# Patient Record
Sex: FEMALE | Race: WHITE | NOT HISPANIC OR LATINO | Employment: UNEMPLOYED | URBAN - METROPOLITAN AREA
[De-identification: names, ages, dates, MRNs, and addresses within clinical notes are randomized per-mention and may not be internally consistent; named-entity substitution may affect disease eponyms.]

---

## 2018-02-01 ENCOUNTER — COMMUNICATION - HEALTHEAST (OUTPATIENT)
Dept: SCHEDULING | Facility: CLINIC | Age: 48
End: 2018-02-01

## 2018-02-05 ENCOUNTER — OFFICE VISIT - HEALTHEAST (OUTPATIENT)
Dept: FAMILY MEDICINE | Facility: CLINIC | Age: 48
End: 2018-02-05

## 2018-02-05 DIAGNOSIS — J04.0 ACUTE LARYNGITIS: ICD-10-CM

## 2018-02-05 DIAGNOSIS — B34.9 VIRAL SYNDROME: ICD-10-CM

## 2018-02-05 ASSESSMENT — MIFFLIN-ST. JEOR: SCORE: 1326.69

## 2018-02-09 ENCOUNTER — OFFICE VISIT - HEALTHEAST (OUTPATIENT)
Dept: FAMILY MEDICINE | Facility: CLINIC | Age: 48
End: 2018-02-09

## 2018-02-09 DIAGNOSIS — R68.89 FLU-LIKE SYMPTOMS: ICD-10-CM

## 2018-07-05 ENCOUNTER — OFFICE VISIT - HEALTHEAST (OUTPATIENT)
Dept: FAMILY MEDICINE | Facility: CLINIC | Age: 48
End: 2018-07-05

## 2018-07-05 DIAGNOSIS — G44.209 TENSION HEADACHE: ICD-10-CM

## 2018-11-13 ENCOUNTER — COMMUNICATION - HEALTHEAST (OUTPATIENT)
Dept: FAMILY MEDICINE | Facility: CLINIC | Age: 48
End: 2018-11-13

## 2018-11-26 ENCOUNTER — OFFICE VISIT - HEALTHEAST (OUTPATIENT)
Dept: FAMILY MEDICINE | Facility: CLINIC | Age: 48
End: 2018-11-26

## 2018-11-26 DIAGNOSIS — Z12.4 SCREENING FOR MALIGNANT NEOPLASM OF CERVIX: ICD-10-CM

## 2018-11-26 DIAGNOSIS — Z12.31 VISIT FOR SCREENING MAMMOGRAM: ICD-10-CM

## 2018-11-26 DIAGNOSIS — Z13.220 SCREENING FOR LIPID DISORDERS: ICD-10-CM

## 2018-11-26 DIAGNOSIS — Z13.1 SCREENING FOR DIABETES MELLITUS: ICD-10-CM

## 2018-11-26 DIAGNOSIS — K43.9 EPIGASTRIC HERNIA: ICD-10-CM

## 2018-11-26 DIAGNOSIS — G43.009 MIGRAINE WITHOUT AURA AND WITHOUT STATUS MIGRAINOSUS, NOT INTRACTABLE: ICD-10-CM

## 2018-11-26 DIAGNOSIS — M67.441 DIGITAL MUCOUS CYST OF RIGHT HAND: ICD-10-CM

## 2018-11-26 DIAGNOSIS — Z11.3 SCREEN FOR STD (SEXUALLY TRANSMITTED DISEASE): ICD-10-CM

## 2018-11-26 DIAGNOSIS — Z00.00 ROUTINE HEALTH MAINTENANCE: ICD-10-CM

## 2018-11-26 ASSESSMENT — MIFFLIN-ST. JEOR: SCORE: 1278.37

## 2018-11-27 LAB
C TRACH DNA SPEC QL PROBE+SIG AMP: NEGATIVE
HPV SOURCE: NORMAL
HUMAN PAPILLOMA VIRUS 16 DNA: NEGATIVE
HUMAN PAPILLOMA VIRUS 18 DNA: NEGATIVE
HUMAN PAPILLOMA VIRUS FINAL DIAGNOSIS: NORMAL
HUMAN PAPILLOMA VIRUS OTHER HR: NEGATIVE
N GONORRHOEA DNA SPEC QL NAA+PROBE: NEGATIVE
SPECIMEN DESCRIPTION: NORMAL

## 2018-12-05 LAB
BKR LAB AP ABNORMAL BLEEDING: YES
BKR LAB AP BIRTH CONTROL/HORMONES: NORMAL
BKR LAB AP CERVICAL APPEARANCE: NORMAL
BKR LAB AP GYN ADEQUACY: NORMAL
BKR LAB AP GYN INTERPRETATION: NORMAL
BKR LAB AP HPV REFLEX: NORMAL
BKR LAB AP LMP: NORMAL
BKR LAB AP PATIENT STATUS: NORMAL
BKR LAB AP PREVIOUS ABNORMAL: NORMAL
BKR LAB AP PREVIOUS NORMAL: NORMAL
HIGH RISK?: NO
PATH REPORT.COMMENTS IMP SPEC: NORMAL
RESULT FLAG (HE HISTORICAL CONVERSION): NORMAL

## 2018-12-06 ENCOUNTER — COMMUNICATION - HEALTHEAST (OUTPATIENT)
Dept: FAMILY MEDICINE | Facility: CLINIC | Age: 48
End: 2018-12-06

## 2019-01-08 ENCOUNTER — COMMUNICATION - HEALTHEAST (OUTPATIENT)
Dept: FAMILY MEDICINE | Facility: CLINIC | Age: 49
End: 2019-01-08

## 2019-01-08 DIAGNOSIS — M79.644 PAIN OF FINGER OF RIGHT HAND: ICD-10-CM

## 2019-01-11 ENCOUNTER — OFFICE VISIT - HEALTHEAST (OUTPATIENT)
Dept: FAMILY MEDICINE | Facility: CLINIC | Age: 49
End: 2019-01-11

## 2019-01-11 DIAGNOSIS — G43.009 MIGRAINE WITHOUT AURA AND WITHOUT STATUS MIGRAINOSUS, NOT INTRACTABLE: ICD-10-CM

## 2019-01-11 DIAGNOSIS — I73.00 RAYNAUD'S DISEASE WITHOUT GANGRENE: ICD-10-CM

## 2019-02-21 ENCOUNTER — COMMUNICATION - HEALTHEAST (OUTPATIENT)
Dept: FAMILY MEDICINE | Facility: CLINIC | Age: 49
End: 2019-02-21

## 2019-05-28 ENCOUNTER — OFFICE VISIT - HEALTHEAST (OUTPATIENT)
Dept: FAMILY MEDICINE | Facility: CLINIC | Age: 49
End: 2019-05-28

## 2019-05-28 DIAGNOSIS — G43.009 MIGRAINE WITHOUT AURA AND WITHOUT STATUS MIGRAINOSUS, NOT INTRACTABLE: ICD-10-CM

## 2019-05-28 DIAGNOSIS — Z13.1 SCREENING FOR DIABETES MELLITUS: ICD-10-CM

## 2019-05-28 DIAGNOSIS — R42 VERTIGO: ICD-10-CM

## 2019-05-28 DIAGNOSIS — Z13.220 SCREENING FOR LIPID DISORDERS: ICD-10-CM

## 2019-05-29 ENCOUNTER — COMMUNICATION - HEALTHEAST (OUTPATIENT)
Dept: LAB | Facility: CLINIC | Age: 49
End: 2019-05-29

## 2019-06-17 ENCOUNTER — COMMUNICATION - HEALTHEAST (OUTPATIENT)
Dept: FAMILY MEDICINE | Facility: CLINIC | Age: 49
End: 2019-06-17

## 2019-06-18 ENCOUNTER — OFFICE VISIT - HEALTHEAST (OUTPATIENT)
Dept: FAMILY MEDICINE | Facility: CLINIC | Age: 49
End: 2019-06-18

## 2019-06-18 ENCOUNTER — COMMUNICATION - HEALTHEAST (OUTPATIENT)
Dept: SCHEDULING | Facility: CLINIC | Age: 49
End: 2019-06-18

## 2019-06-18 DIAGNOSIS — Z00.00 ROUTINE HEALTH MAINTENANCE: ICD-10-CM

## 2019-06-18 DIAGNOSIS — R79.89 ELEVATED TSH: ICD-10-CM

## 2019-06-18 DIAGNOSIS — M25.531 RIGHT WRIST PAIN: ICD-10-CM

## 2019-06-18 DIAGNOSIS — N92.4 PERIMENOPAUSAL MENORRHAGIA: ICD-10-CM

## 2019-06-18 LAB
ANION GAP SERPL CALCULATED.3IONS-SCNC: 11 MMOL/L (ref 5–18)
BUN SERPL-MCNC: 12 MG/DL (ref 8–22)
CALCIUM SERPL-MCNC: 9 MG/DL (ref 8.5–10.5)
CHLORIDE BLD-SCNC: 107 MMOL/L (ref 98–107)
CHOLEST SERPL-MCNC: 205 MG/DL
CO2 SERPL-SCNC: 24 MMOL/L (ref 22–31)
CREAT SERPL-MCNC: 0.69 MG/DL (ref 0.6–1.1)
ERYTHROCYTE [DISTWIDTH] IN BLOOD BY AUTOMATED COUNT: 11.3 % (ref 11–14.5)
FASTING STATUS PATIENT QL REPORTED: YES
GFR SERPL CREATININE-BSD FRML MDRD: >60 ML/MIN/1.73M2
GLUCOSE BLD-MCNC: 59 MG/DL (ref 70–125)
HCT VFR BLD AUTO: 38.6 % (ref 35–47)
HDLC SERPL-MCNC: 67 MG/DL
HGB BLD-MCNC: 13 G/DL (ref 12–16)
LDLC SERPL CALC-MCNC: 123 MG/DL
MCH RBC QN AUTO: 31.2 PG (ref 27–34)
MCHC RBC AUTO-ENTMCNC: 33.6 G/DL (ref 32–36)
MCV RBC AUTO: 93 FL (ref 80–100)
PLATELET # BLD AUTO: 265 THOU/UL (ref 140–440)
PMV BLD AUTO: 6.7 FL (ref 7–10)
POTASSIUM BLD-SCNC: 4.3 MMOL/L (ref 3.5–5)
RBC # BLD AUTO: 4.15 MILL/UL (ref 3.8–5.4)
SODIUM SERPL-SCNC: 142 MMOL/L (ref 136–145)
T4 FREE SERPL-MCNC: 0.8 NG/DL (ref 0.7–1.8)
TRIGL SERPL-MCNC: 77 MG/DL
TSH SERPL DL<=0.005 MIU/L-ACNC: 8.13 UIU/ML (ref 0.3–5)
WBC: 7.9 THOU/UL (ref 4–11)

## 2019-06-19 LAB — 25(OH)D3 SERPL-MCNC: 29.2 NG/ML (ref 30–80)

## 2019-06-20 LAB — THYROID PEROXIDASE ANTIBODIES - HISTORICAL: 855.8 IU/ML (ref 0–5.6)

## 2019-06-21 ENCOUNTER — RECORDS - HEALTHEAST (OUTPATIENT)
Dept: ADMINISTRATIVE | Facility: OTHER | Age: 49
End: 2019-06-21

## 2019-06-21 ENCOUNTER — COMMUNICATION - HEALTHEAST (OUTPATIENT)
Dept: FAMILY MEDICINE | Facility: CLINIC | Age: 49
End: 2019-06-21

## 2019-06-21 DIAGNOSIS — E06.3 HYPOTHYROIDISM DUE TO HASHIMOTO'S THYROIDITIS: ICD-10-CM

## 2019-07-01 ENCOUNTER — AMBULATORY - HEALTHEAST (OUTPATIENT)
Dept: FAMILY MEDICINE | Facility: CLINIC | Age: 49
End: 2019-07-01

## 2019-07-01 ENCOUNTER — HOSPITAL ENCOUNTER (OUTPATIENT)
Dept: MAMMOGRAPHY | Facility: CLINIC | Age: 49
Discharge: HOME | End: 2019-07-01

## 2019-07-01 ENCOUNTER — COMMUNICATION - HEALTHEAST (OUTPATIENT)
Dept: FAMILY MEDICINE | Facility: CLINIC | Age: 49
End: 2019-07-01

## 2019-07-01 ENCOUNTER — HOSPITAL ENCOUNTER (OUTPATIENT)
Dept: ULTRASOUND IMAGING | Facility: CLINIC | Age: 49
Discharge: HOME | End: 2019-07-01

## 2019-07-01 DIAGNOSIS — Z12.31 VISIT FOR SCREENING MAMMOGRAM: ICD-10-CM

## 2019-07-01 DIAGNOSIS — R93.89 ENDOMETRIAL THICKENING ON ULTRASOUND: ICD-10-CM

## 2019-07-01 DIAGNOSIS — N92.4 PERIMENOPAUSAL MENORRHAGIA: ICD-10-CM

## 2019-07-02 ENCOUNTER — COMMUNICATION - HEALTHEAST (OUTPATIENT)
Dept: FAMILY MEDICINE | Facility: CLINIC | Age: 49
End: 2019-07-02

## 2019-07-03 ENCOUNTER — RECORDS - HEALTHEAST (OUTPATIENT)
Dept: ADMINISTRATIVE | Facility: OTHER | Age: 49
End: 2019-07-03

## 2019-07-10 ENCOUNTER — COMMUNICATION - HEALTHEAST (OUTPATIENT)
Dept: FAMILY MEDICINE | Facility: CLINIC | Age: 49
End: 2019-07-10

## 2019-07-15 ENCOUNTER — OFFICE VISIT - HEALTHEAST (OUTPATIENT)
Dept: FAMILY MEDICINE | Facility: CLINIC | Age: 49
End: 2019-07-15

## 2019-07-15 ENCOUNTER — AMBULATORY - HEALTHEAST (OUTPATIENT)
Dept: FAMILY MEDICINE | Facility: CLINIC | Age: 49
End: 2019-07-15

## 2019-07-15 DIAGNOSIS — E06.3 HYPOTHYROIDISM DUE TO HASHIMOTO'S THYROIDITIS: ICD-10-CM

## 2019-07-15 DIAGNOSIS — E16.2 HYPOGLYCEMIA: ICD-10-CM

## 2019-07-15 DIAGNOSIS — G43.009 MIGRAINE WITHOUT AURA AND WITHOUT STATUS MIGRAINOSUS, NOT INTRACTABLE: ICD-10-CM

## 2019-07-15 LAB
FASTING STATUS PATIENT QL REPORTED: NORMAL
GLUCOSE BLD-MCNC: 116 MG/DL (ref 74–125)
HBA1C MFR BLD: 5.2 % (ref 3.5–6)

## 2019-07-19 ENCOUNTER — COMMUNICATION - HEALTHEAST (OUTPATIENT)
Dept: FAMILY MEDICINE | Facility: CLINIC | Age: 49
End: 2019-07-19

## 2019-07-19 DIAGNOSIS — Z86.79 HISTORY OF MITRAL VALVE PROLAPSE: ICD-10-CM

## 2019-07-23 ENCOUNTER — OFFICE VISIT - HEALTHEAST (OUTPATIENT)
Dept: FAMILY MEDICINE | Facility: CLINIC | Age: 49
End: 2019-07-23

## 2019-07-23 DIAGNOSIS — Z01.818 ENCOUNTER FOR PREOPERATIVE EXAMINATION FOR GENERAL SURGICAL PROCEDURE: ICD-10-CM

## 2019-07-23 DIAGNOSIS — E06.3 HYPOTHYROIDISM DUE TO HASHIMOTO'S THYROIDITIS: ICD-10-CM

## 2019-07-23 DIAGNOSIS — N92.1 MENORRHAGIA WITH IRREGULAR CYCLE: ICD-10-CM

## 2019-07-23 DIAGNOSIS — R93.89 THICKENED ENDOMETRIUM: ICD-10-CM

## 2019-07-23 LAB — HGB BLD-MCNC: 13.4 G/DL (ref 12–16)

## 2019-07-23 ASSESSMENT — MIFFLIN-ST. JEOR: SCORE: 1292.1

## 2019-08-02 ASSESSMENT — MIFFLIN-ST. JEOR: SCORE: 1296.07

## 2019-08-06 ENCOUNTER — ANESTHESIA - HEALTHEAST (OUTPATIENT)
Dept: SURGERY | Facility: HOSPITAL | Age: 49
End: 2019-08-06

## 2019-08-06 ENCOUNTER — SURGERY - HEALTHEAST (OUTPATIENT)
Dept: SURGERY | Facility: HOSPITAL | Age: 49
End: 2019-08-06

## 2019-08-06 ASSESSMENT — MIFFLIN-ST. JEOR: SCORE: 1307.7

## 2019-08-30 ENCOUNTER — RECORDS - HEALTHEAST (OUTPATIENT)
Dept: ADMINISTRATIVE | Facility: OTHER | Age: 49
End: 2019-08-30

## 2019-09-10 ENCOUNTER — RECORDS - HEALTHEAST (OUTPATIENT)
Dept: ADMINISTRATIVE | Facility: OTHER | Age: 49
End: 2019-09-10

## 2019-12-16 ENCOUNTER — OFFICE VISIT - HEALTHEAST (OUTPATIENT)
Dept: FAMILY MEDICINE | Facility: CLINIC | Age: 49
End: 2019-12-16

## 2019-12-16 DIAGNOSIS — G43.009 MIGRAINE WITHOUT AURA AND WITHOUT STATUS MIGRAINOSUS, NOT INTRACTABLE: ICD-10-CM

## 2019-12-16 DIAGNOSIS — Z01.818 ENCOUNTER FOR PREOPERATIVE EXAMINATION FOR GENERAL SURGICAL PROCEDURE: ICD-10-CM

## 2019-12-16 DIAGNOSIS — R22.31 MASS OF RIGHT HAND: ICD-10-CM

## 2019-12-16 RX ORDER — IBUPROFEN 600 MG/1
600 TABLET, FILM COATED ORAL EVERY 6 HOURS PRN
Qty: 30 TABLET | Refills: 0 | Status: SHIPPED | OUTPATIENT
Start: 2019-12-16

## 2019-12-16 RX ORDER — SUMATRIPTAN 50 MG/1
50 TABLET, FILM COATED ORAL
Qty: 20 TABLET | Refills: 1 | Status: SHIPPED | OUTPATIENT
Start: 2019-12-16

## 2019-12-24 ENCOUNTER — RECORDS - HEALTHEAST (OUTPATIENT)
Dept: ADMINISTRATIVE | Facility: OTHER | Age: 49
End: 2019-12-24

## 2019-12-24 LAB
LAB AP CHARGES (HE HISTORICAL CONVERSION): NORMAL
PATH REPORT.COMMENTS IMP SPEC: NORMAL
PATH REPORT.FINAL DX SPEC: NORMAL
PATH REPORT.GROSS SPEC: NORMAL
PATH REPORT.MICROSCOPIC SPEC OTHER STN: NORMAL
PATH REPORT.RELEVANT HX SPEC: NORMAL
RESULT FLAG (HE HISTORICAL CONVERSION): NORMAL

## 2020-06-05 ENCOUNTER — OFFICE VISIT - HEALTHEAST (OUTPATIENT)
Dept: FAMILY MEDICINE | Facility: CLINIC | Age: 50
End: 2020-06-05

## 2020-06-05 DIAGNOSIS — Z20.822 SUSPECTED 2019 NOVEL CORONAVIRUS INFECTION: ICD-10-CM

## 2020-06-16 ENCOUNTER — COMMUNICATION - HEALTHEAST (OUTPATIENT)
Dept: SCHEDULING | Facility: CLINIC | Age: 50
End: 2020-06-16

## 2020-06-16 ENCOUNTER — OFFICE VISIT - HEALTHEAST (OUTPATIENT)
Dept: FAMILY MEDICINE | Facility: CLINIC | Age: 50
End: 2020-06-16

## 2020-06-16 ENCOUNTER — COMMUNICATION - HEALTHEAST (OUTPATIENT)
Dept: FAMILY MEDICINE | Facility: CLINIC | Age: 50
End: 2020-06-16

## 2020-06-16 DIAGNOSIS — B02.9 HERPES ZOSTER WITHOUT COMPLICATION: ICD-10-CM

## 2020-06-18 ENCOUNTER — OFFICE VISIT - HEALTHEAST (OUTPATIENT)
Dept: FAMILY MEDICINE | Facility: CLINIC | Age: 50
End: 2020-06-18

## 2020-06-18 DIAGNOSIS — E06.3 HYPOTHYROIDISM DUE TO HASHIMOTO'S THYROIDITIS: ICD-10-CM

## 2020-06-18 DIAGNOSIS — F41.8 SITUATIONAL ANXIETY: ICD-10-CM

## 2020-06-18 RX ORDER — HYDROXYZINE HYDROCHLORIDE 50 MG/1
50-100 TABLET, FILM COATED ORAL 3 TIMES DAILY PRN
Qty: 60 TABLET | Refills: 0 | Status: SHIPPED | OUTPATIENT
Start: 2020-06-18 | End: 2023-01-27

## 2020-06-18 ASSESSMENT — ANXIETY QUESTIONNAIRES
4. TROUBLE RELAXING: NEARLY EVERY DAY
7. FEELING AFRAID AS IF SOMETHING AWFUL MIGHT HAPPEN: NEARLY EVERY DAY
3. WORRYING TOO MUCH ABOUT DIFFERENT THINGS: NEARLY EVERY DAY
5. BEING SO RESTLESS THAT IT IS HARD TO SIT STILL: NEARLY EVERY DAY
6. BECOMING EASILY ANNOYED OR IRRITABLE: NEARLY EVERY DAY
GAD7 TOTAL SCORE: 21
IF YOU CHECKED OFF ANY PROBLEMS ON THIS QUESTIONNAIRE, HOW DIFFICULT HAVE THESE PROBLEMS MADE IT FOR YOU TO DO YOUR WORK, TAKE CARE OF THINGS AT HOME, OR GET ALONG WITH OTHER PEOPLE: VERY DIFFICULT
1. FEELING NERVOUS, ANXIOUS, OR ON EDGE: NEARLY EVERY DAY
2. NOT BEING ABLE TO STOP OR CONTROL WORRYING: NEARLY EVERY DAY

## 2020-06-18 ASSESSMENT — PATIENT HEALTH QUESTIONNAIRE - PHQ9: SUM OF ALL RESPONSES TO PHQ QUESTIONS 1-9: 22

## 2021-05-26 ASSESSMENT — PATIENT HEALTH QUESTIONNAIRE - PHQ9: SUM OF ALL RESPONSES TO PHQ QUESTIONS 1-9: 22

## 2021-05-28 ASSESSMENT — ANXIETY QUESTIONNAIRES: GAD7 TOTAL SCORE: 21

## 2021-05-29 NOTE — TELEPHONE ENCOUNTER
Describe your symptoms: Right wrist pain- cyst in palm. Painful, tender to touch, swollen.   Any pain: yes  New/Ongoing: Ongoing  How long have you been having symptoms: 1  week(s)  Have you been seen for this:  Yes.  Have your symptoms changed since this visit? Yes: Continues to be painful, tender to touch, swollen  Triage offered?: patient declined  Home remedies tried: OTC Ibuprofen, OTC Tylenol, Wrist brace  Pharmacy Name and Location: [facility]  Okay to leave a detailed message? Yes       Patient states that this was discussed on 1/11/2019 at her [facility] with Maryjane Palacios NP. However, the pain is becoming more problematic. Does she need to be seen again? Can she be referred to a specialist? CT or MRI scan?    Please advise

## 2021-05-29 NOTE — TELEPHONE ENCOUNTER
Call to patient regarding labs results.    TSH is elevated.  Thyroid antibodies elevated consistent with autoimmune thyroiditis.    Start Levothyroxine 1 mcg/kg/day - 75 mcg daily.  Discussed proper use of medication.    Follow up in 8-12 weeks for recheck of labs.    Maryjane Palacios NP

## 2021-05-29 NOTE — TELEPHONE ENCOUNTER
Patient Returning Call  Reason for call:  Patient  Information relayed to patient:  Patient informed of need for visit with provider.  Patient has additional questions:  Yes  If YES, what are your questions/concerns:  She is wondering if she can be worked in at the end of a day to avoid taking time of work. Please advise.  Okay to leave a detailed message?: Yes

## 2021-05-29 NOTE — TELEPHONE ENCOUNTER
There were labs ordered by Maryjane last November that were released for blood draw yesterday when you saw the patient. I don't see that she made it to lab for blood draw. Was she suppose to have these done or was this a interfacing error?

## 2021-05-29 NOTE — PROGRESS NOTES
Assessment:   1. Vertigo     Plan:   Meclizine per medication orders.  Antiemetic per medication orders.  OTC decongestants.  Tilt table testing.  Vestibular studies  The nature of vertigo syndromes were discussed along with their usual course and treatment.  Educational materials given and questions answered.  Follow up in 2 weeks.     Subjective:   Mavis Connell is a 48 y.o. female who presents for evaluation of dizziness. The symptoms started 8 days ago and are ongoing. The attacks occur frequently and last a few minutes. Positions that worsen symptoms: bending over, head back, rolling in bed to the right and standing up. Previous workup/treatments: none. Associated ear symptoms: aural pressure. Associated CNS symptoms: none. Recent infections: none. Head trauma: denied. Drug ingestion: none. Noise exposure: no occupational exposure and phone service. Family history: non-contributory.    The following portions of the patient's history were reviewed and updated as appropriate: allergies, current medications, past family history, past medical history, past social history, past surgical history and problem list.    Review of Systems  Pertinent items are noted in HPI.      Objective:   /79   Pulse 78   Wt 159 lb 8 oz (72.3 kg)   SpO2 97%   BMI 29.65 kg/m    General appearance: alert, appears stated age and cooperative  Head: Normocephalic, without obvious abnormality, atraumatic  Eyes: conjunctivae/corneas clear. PERRL, EOM's intact. Fundi benign.  Ears: normal TM's and external ear canals both ears  Throat: lips, mucosa, and tongue normal; teeth and gums normal  Lungs: clear to auscultation bilaterally  Heart: regular rate and rhythm, S1, S2 normal, no murmur, click, rub or gallop  Pulses: 2+ and symmetric  Skin: Skin color, texture, turgor normal. No rashes or lesions  Lymph nodes: Cervical, supraclavicular, and axillary nodes normal.  Neurologic: Grossly normal

## 2021-05-29 NOTE — TELEPHONE ENCOUNTER
RN triage   Lab calling with critical lab  Glucose 59 drawn at 9:09 am  Patient seen in clinic today and had fasting labs done.  Contacted patient, she states that she is feeling well, no shakiness, sweating, dizziness.  She has eaten through out the day today.    Paged on call provider for review 6:55 pm  [place] paging called to say the Dr. Ohara is on her cell phone for calls. Left voicemail requesting return call.  Jacinta Graves RN  [facility] Triage Nurse  7:00 PM  6/18/2019  Spoke with on call Dr. Ohara, no changes PCP to review.  Left message for patient to call back.  Jacinta Graves RN  [facility] Triage Nurse  7:09 PM  6/18/2019            Reason for Disposition    Lab or radiology calling with CRITICAL test results    Protocols used: PCP CALL - NO TRIAGE-A-

## 2021-05-29 NOTE — PROGRESS NOTES
Assessment/Plan:     1. Routine health maintenance  Previously did not have her fasting labs completed after her last physical.  Will complete these today, as she is fasting.  - Vitamin D, Total (25-Hydroxy)  - HM2(CBC w/o Differential)  - Thyroid Cascade  - Basic Metabolic Panel  - Lipid Cascade FASTING    2. Right wrist pain  Suspect a cyst in the hand and wrist.  Will refer to orthopedics to discuss if removal is warranted.   - Ambulatory referral to Orthopedics    3. Perimenopausal menorrhagia  Will send for pelvic US to r/o fibroids or other cause of bleeding.  May just be perimenopausal irregularity.  If US is normal, can consider OCPs, IUD, or referral to gynecology for ablation.   - US Pelvis With Transvaginal Non OB; Future        Subjective:     Mavis Connell is a 48 y.o. female who presents with complaints of right hand and wrist pain.  Patient previously has been seen for a small cyst on the palm of her right hand.  She does believe this has got an bigger.  It is tender when she tries to use a mouse at work.  Patient has also developed a small mass on the ulnar side of her right wrist.  This developed about 1 to 2 weeks ago, and has gotten bigger.  She does have some numbness and tingling in her fifth finger.  The pain is bothersome, especially when she is using a computer.    Patient also complains of very heavy menstrual bleeding.  Her menstrual cycles are irregular.  She did not have a period for about 3 months, then will get very heavy periods for a couple of months.  States she is currently on day 9 of bleeding.  She tells me she uses up to 12 maxipads every 4 hours.  The amount of bleeding is getting in the way of her being productive at work.  Denies any significant abdominal pain or cramping.      The following portions of the patient's history were reviewed and updated as appropriate: allergies, current medications.    Review of Systems  A comprehensive review of systems was performed and was  otherwise negative    Objective:     /60   Pulse 60   Wt 159 lb 11.2 oz (72.4 kg)   BMI 29.69 kg/m      General Appearance: Alert, cooperative, no distress, appears stated age  Lungs: Clear to auscultation bilaterally, respirations unlabored  Heart: Regular rate and rhythm, S1 and S2 normal, no murmur, rub, or gallop   Abdomen: Soft, non-tender, bowel sounds active all four quadrants,  no masses, no organomegaly  Extremities: Extremities normal, atraumatic, no cyanosis or edema  Skin: Palpable, nontender, soft mass on the palm of right hand and ulnar side of right wrist.   strength normal.  Sensation grossly intact.    Maryjane Palacios, NP

## 2021-05-29 NOTE — TELEPHONE ENCOUNTER
Called got disconnected. If Pt called back inform her an office visit is recommended so Maryjane can reassess her.

## 2021-05-30 NOTE — TELEPHONE ENCOUNTER
Call to patient.  Discussed her concerns.  She is scheduled with gynecology tomorrow.    Maryjane Palacios NP

## 2021-05-30 NOTE — TELEPHONE ENCOUNTER
Left detailed message regarding clinician's message. Instructed patient to call main clinic number to schedule appointment.

## 2021-05-30 NOTE — TELEPHONE ENCOUNTER
Medication Request  Medication name: Amoxicillin  Pharmacy Name and Location: [facility]  Reason for request: Patient has a dental appointment for tooth repair Monday morning at 7 am (7.22.2019) and needs to be apophylactically treated due to history of open heart surgery.   When did you use medication last?:  2017  Patient offered appointment:  patient declined  Okay to leave a detailed message: yes

## 2021-05-30 NOTE — PROGRESS NOTES
Assessment/Plan:     1. Hypothyroidism due to Hashimoto's thyroiditis  I am not quite sure if patient's symptoms are related to the levothyroxine.  She did have previous issues with dizziness, nausea, and headaches.  Regardless, I think it would be fine for her to stop the levothyroxine at this point.  We decided to get her past her hysterectomy, and then reevaluate.  I would like to see her about 2 to 3 weeks after her surgery.  We will recheck a thyroid level at that point, and discuss putting her back on the levothyroxine.  Will likely started a smaller dose.  She continues not to tolerate it, I would recommend endocrinology input.    2. Migraine without aura and without status migrainosus, not intractable  - SUMAtriptan (IMITREX) 50 MG tablet; Take 1 tablet (50 mg total) by mouth every 2 (two) hours as needed for migraine. Up to 200 mg/day.  Dispense: 20 tablet; Refill: 0  - ondansetron (ZOFRAN) 4 MG tablet; Take 1 tablet (4 mg total) by mouth every 8 (eight) hours as needed for nausea.  Dispense: 30 tablet; Refill: 0    3. Hypoglycemia  Will check a nonfasting glucose and Hgb A1c today to rule out diabetes.   - Glucose  - Glycosylated Hemoglobin A1c      Subjective:     Mavis Connell is a 48 y.o. female who presents for follow up regarding hypothyroidism.  Patient was recently started on Levothyroxine.  She had an elevated TSH and thyroid antibodies.  Labs were completed at her routine physical.  Patient reports symptoms of headaches, dizziness, and nausea since starting the medication.  She has vomited on a couple of occasions.  Denies any significant abdominal pain.  She stopped the Levothyroxine about 2 days ago, and the headaches and dizziness are better.  She is still having a little bit of nausea.  Patient states she did have more energy whil she was on the medication.  She also had a blood sugar of 59 with her labs.  She reports she is eating regularly.  She is currently drinking a regular Coke.  She does  have less nausea and dizziness about 1 hour after she eats.  Patient is scheduled for a hysterectomy next month.      The following portions of the patient's history were reviewed and updated as appropriate: allergies, current medications.    Review of Systems  A comprehensive review of systems was performed and was otherwise negative    Objective:     /80   Pulse 60   Temp 98.8  F (37.1  C)   Wt 159 lb (72.1 kg)   BMI 29.56 kg/m      General Appearance: Alert, cooperative, no distress, appears stated age  Eyes: PERRL, conjunctiva/corneas clear, EOM's intact  Throat: Lips, mucosa, and tongue normal; teeth and gums normal  Neck: Supple, symmetrical, trachea midline, no adenopathy  Lungs: Clear to auscultation bilaterally, respirations unlabored  Heart: Regular rate and rhythm, S1 and S2 normal, no murmur, rub, or gallop   Abdomen: Soft, non-tender, bowel sounds active all four quadrants,  no masses, no organomegaly      Maryjane Palacios, RADHA

## 2021-05-30 NOTE — TELEPHONE ENCOUNTER
Patient informed of clinician's message. No further questions.   Patient will go to [facility] today.

## 2021-05-30 NOTE — TELEPHONE ENCOUNTER
Describe your symptoms: on and off  Dizziness, Sweats , nausea   Any pain: no  New/Ongoing: Ongoing  How long have you been having symptoms: 3 months  Have you been seen for this:  Yes.  Have your symptoms changed since this visit? No  Triage offered?: patient declined  Home remedies tried: NA  Pharmacy Name and Location: listed   Okay to leave a detailed message? Yes   Please advise on the next best course of action ASAP. Patient would like to be seen . Please all ASAP patient requesting to speak to MD. Or Nurse .  Thank You

## 2021-05-30 NOTE — TELEPHONE ENCOUNTER
----- Message from Maryjane Palacios NP sent at 7/1/2019  4:42 PM CDT -----  Please call patient.    Her pelvic ultrasound shows a thickened lining of her uterus.  This is likely the cause of your heavy and irregular bleeding.  I recommend a referral to gynecology for a possible biopsy.  I have placed this referral.    Maryjane Palacios NP

## 2021-05-30 NOTE — TELEPHONE ENCOUNTER
Medication Question or Clarification  Who is calling: Patient  What medication are you calling about? (include dose and sig) Levothyroxine 75  Who prescribed the medication?: Maryjane Palacios NP  What is your question/concern?: Patient calling stating since she started taking medication, feeling nauseous , light headed, cant sleep, muscle aches, Please call and advise.   Pharmacy: [facility]- [address]   Okay to leave a detailed message?: Yes  Site CMT - Please call the pharmacy to obtain any additional needed information.

## 2021-05-30 NOTE — TELEPHONE ENCOUNTER
Patient has concerns relating to her ultrasound findings - especially related to cancer. Patient would like you to call her if possible to discuss further.

## 2021-05-30 NOTE — PROGRESS NOTES
Preoperative Exam    Scheduled Procedure: ROBOTIC TOTAL LAPAROSCOPIC HYSTERECTOMY BILATERAL SALPINGECTOMY  Surgery Date:  8/6/19  Surgery Location: [facility], fax [phone] and  [phone]  Surgeon:  Va Beatty DO    Assessment/Plan:     1. Encounter for preoperative examination for general surgical procedure  Hemoglobin stable.  Recommend a urine pregnancy test the morning of surgery.  - Hemoglobin    2. Menorrhagia with irregular cycle    3. Thickened endometrium    4. Hypothyroidism due to Hashimoto's thyroiditis  Did not tolerate Levothyroxine well.  She is currently off replacement. Will plan on rechecking her TSH 1 month after surgery and consider restarting Levothyroxine at a lower dose.  This does not affect her surgery.     Surgical Procedure Risk: Low (reported cardiac risk generally < 1%)  Have you had prior anesthesia?: Yes  Have you or any family members had a previous anesthesia reaction:  No  Do you or any family members have a history of a clotting or bleeding disorder?: No  Cardiac Risk Assessment: no increased risk for major cardiac complications    APPROVAL GIVEN to proceed with proposed procedure, without further diagnostic evaluation    Please Note:  None    Functional Status: Independent  Patient plans to recover at home with family.     Subjective:      Mavis Connell is a 48 y.o. female who presents for a preoperative consultation.  Patient is scheduled for a hysterectomy and salpingectomy.  She has been having a lot of irregular perimenopausal bleeding.  The bleeding has been quite heavy.  Patient was referred for a pelvic US, which showed a thickened endometrium.  She was referred to gynecology for an endometrial biopsy, and they recommended a hysterectomy.  Patient has stopped bleeding.    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  She follows with cardiology every 3 years.    Recently had labs that showed an elevated TSH and TPO.  Patient was  started on Levothyroxine, but did not tolerate secondary to nausea and headaches.  She is currently off the medication and feeling much better.     All other systems reviewed and are negative, other than those listed in the HPI.    Pertinent History  Do you have difficulty breathing or chest pain after walking up a flight of stairs: No  History of obstructive sleep apnea: No  Steroid use in the last 6 months: No  Frequent Aspirin/NSAID use: Yes: advil  Prior Blood Transfusion: No  Prior Blood Transfusion Reaction: No  If for some reason prior to, during or after the procedure, if it is medically indicated, would you be willing to have a blood transfusion?:  There is no transfusion refusal.    Current Outpatient Medications   Medication Sig Dispense Refill     calcium-vitamin D (CALCIUM-VITAMIN D) 500 mg(1,250mg) -200 unit per tablet Take 1 tablet by mouth 2 (two) times a day with meals.       MULTIVITAMIN ORAL Take 1 tablet by mouth daily.       ondansetron (ZOFRAN) 4 MG tablet Take 1 tablet (4 mg total) by mouth every 8 (eight) hours as needed for nausea. 30 tablet 0     SUMAtriptan (IMITREX) 50 MG tablet Take 1 tablet (50 mg total) by mouth every 2 (two) hours as needed for migraine. Up to 200 mg/day. 20 tablet 0     No current facility-administered medications for this visit.         No Known Allergies    Patient Active Problem List   Diagnosis     Epigastric hernia     Migraine without aura and without status migrainosus, not intractable     Hypothyroidism due to Hashimoto's thyroiditis       No past medical history on file.    Past Surgical History:   Procedure Laterality Date     CARDIAC SURGERY      Prolapsed valve as child      SECTION, CLASSIC         Social History     Socioeconomic History     Marital status: Single     Spouse name: Not on file     Number of children: Not on file     Years of education: Not on file     Highest education level: Not on file   Occupational History     Not on file  "  Social Needs     Financial resource strain: Not on file     Food insecurity:     Worry: Not on file     Inability: Not on file     Transportation needs:     Medical: Not on file     Non-medical: Not on file   Tobacco Use     Smoking status: Never Smoker     Smokeless tobacco: Never Used   Substance and Sexual Activity     Alcohol use: Yes     Comment: occasional     Drug use: No     Sexual activity: Yes     Partners: Male     Birth control/protection: None   Lifestyle     Physical activity:     Days per week: Not on file     Minutes per session: Not on file     Stress: Not on file   Relationships     Social connections:     Talks on phone: Not on file     Gets together: Not on file     Attends [employer] service: Not on file     Active member of club or organization: Not on file     Attends meetings of clubs or organizations: Not on file     Relationship status: Not on file     Intimate partner violence:     Fear of current or ex partner: Not on file     Emotionally abused: Not on file     Physically abused: Not on file     Forced sexual activity: Not on file   Other Topics Concern     Not on file   Social History Narrative     Not on file       Patient Care Team:  Maryjane Palacios NP as PCP - General (Family Medicine)          Objective:     Vitals:    07/23/19 1446   BP: 114/70   Pulse: 82   SpO2: 97%   Weight: 160 lb (72.6 kg)   Height: 5' 1.25\" (1.556 m)         Physical Exam:  General Appearance: Alert, cooperative, no distress, appears stated age  Eyes: PERRL, conjunctiva/corneas clear, EOM's intact  Ears: Normal TM's and external ear canals, both ears  Nose: Nares normal, septum midline  Throat: Lips, mucosa, and tongue normal; teeth and gums normal  Neck: Supple, symmetrical, trachea midline, no adenopathy; thyroid: not enlarged, symmetric, no tenderness/mass/nodules; no carotid bruit or JVD  Back: no CVA tenderness  Lungs: Clear to auscultation bilaterally, respirations unlabored  Heart: Regular rate and " rhythm, S1 and S2 normal, no murmur, rub, or gallop  Abdomen: Soft, non-tender, bowel sounds active all four quadrants,  no masses, no organomegaly  Extremities: Extremities normal, atraumatic, no cyanosis or edema  Skin: Skin color, texture, turgor normal, no rashes or lesions  Lymph nodes: Cervical, supraclavicular nodes normal  Neurologic: Normal   Psychologic: appropriate affective, answers all of my questions appropriately. No hallucinations, delusion, or suicidal ideations.      Patient Instructions     Hold all supplements, aspirin and NSAIDs for 7 days prior to surgery.  Follow your surgeon's direction on when to stop eating and drinking prior to surgery.  Your surgeon will be managing your pain after your surgery.    Remove all jewelry and metal piercings before your surgery.   Remove nail polish from fingers before surgery.      Labs:  Recent Results (from the past 24 hour(s))   Hemoglobin    Collection Time: 07/23/19  3:24 PM   Result Value Ref Range    Hemoglobin 13.4 12.0 - 16.0 g/dL       Immunization History   Administered Date(s) Administered     Influenza, inj, historic,unspecified 11/13/2018     Influenza,live, Nasal Laiv4 01/28/2015     Influenza,seasonal quad, PF, 36+MOS 10/17/2017     Tdap 11/26/2018           Electronically signed by Maryjane Palacios NP 07/24/19 2:39 PM

## 2021-05-30 NOTE — TELEPHONE ENCOUNTER
I would advise patient to eat and drink something first, as this could be a symptom of low blood sugar.    I would advise an office visit.  I do not have any openings today.  She can schedule with me tomorrow or see another provider/WIC today.    Maryjane Palacios NP

## 2021-05-30 NOTE — TELEPHONE ENCOUNTER
New Appointment Needed  What is the reason for the visit:    Medication Follow up . Check up , No appointments available until Monday . Patient is requesting to be squeezed In today around 3pm if possible  because of severe medication side effects of medications prescribed.   Please advise ASAP   Provider Preference: PCP only  How soon do you need to be seen?: today  Waitlist offered?: No  Okay to leave a detailed message:  Yes  [phone]

## 2021-05-31 VITALS — HEIGHT: 62 IN | WEIGHT: 165 LBS | BODY MASS INDEX: 30.36 KG/M2

## 2021-05-31 VITALS — BODY MASS INDEX: 30.73 KG/M2 | WEIGHT: 168 LBS

## 2021-05-31 NOTE — ANESTHESIA CARE TRANSFER NOTE
Last vitals:   Vitals:    08/06/19 1137   BP: 137/82   Pulse: 61   Resp: 12   Temp: 36.9  C (98.4  F)   SpO2: 99%     Patient's level of consciousness is awake  Spontaneous respirations: yes  Maintains airway independently: yes  Dentition unchanged: yes  Oropharynx: oropharynx clear of all foreign objects    QCDR Measures:  ASA# 20 - Surgical Safety Checklist: WHO surgical safety checklist completed prior to induction    PQRS# 430 - Adult PONV Prevention: 4558F - Pt received => 2 anti-emetic agents (different classes) preop & intraop  ASA# 8 - Peds PONV Prevention: 4558F - Pt received => 2 anti-emetic agents (different classes) preop & intraop  PQRS# 424 - Janice-op Temp Management: 4559F - At least one body temp DOCUMENTED => 35.5C or 95.9F within required timeframe  PQRS# 426 - PACU Transfer Protocol: - Transfer of care checklist used  ASA# 14 - Acute Post-op Pain: ASA14B - Patient did NOT experience pain >= 7 out of 10

## 2021-05-31 NOTE — ANESTHESIA POSTPROCEDURE EVALUATION
Patient: Mavis Connell  ROBOTIC TOTAL LAPAROSCOPIC HYSTERECTOMY BILATERAL SALPINGECTOMY, CYSTOSCOPY  Anesthesia type: general    Patient location: PACU  Last vitals:   Vitals Value Taken Time   /91 8/6/2019 12:40 PM   Temp 36.9  C (98.4  F) 8/6/2019 11:37 AM   Pulse 63 8/6/2019 12:49 PM   Resp 20 8/6/2019 12:49 PM   SpO2 92 % 8/6/2019 12:49 PM   Vitals shown include unvalidated device data.  Post vital signs: stable  Level of consciousness: awake and responds to simple questions  Post-anesthesia pain: pain controlled  Post-anesthesia nausea and vomiting: yes, mild nausea, no emesis  Pulmonary: unassisted, return to baseline  Cardiovascular: stable and blood pressure at baseline  Hydration: adequate  Anesthetic events: no    QCDR Measures:  ASA# 11 - Janice-op Cardiac Arrest: ASA11B - Patient did NOT experience unanticipated cardiac arrest  ASA# 12 - Janice-op Mortality Rate: ASA12B - Patient did NOT die  ASA# 13 - PACU Re-Intubation Rate: ASA13B - Patient did NOT require a new airway mgmt  ASA# 10 - Composite Anes Safety: ASA10A - No serious adverse event    Additional Notes:

## 2021-06-01 VITALS — WEIGHT: 158.2 LBS | BODY MASS INDEX: 28.94 KG/M2

## 2021-06-02 VITALS — WEIGHT: 156.1 LBS | HEIGHT: 62 IN | BODY MASS INDEX: 28.73 KG/M2

## 2021-06-02 VITALS — BODY MASS INDEX: 29.65 KG/M2 | WEIGHT: 159.5 LBS

## 2021-06-02 VITALS — BODY MASS INDEX: 29.52 KG/M2 | WEIGHT: 158.8 LBS

## 2021-06-03 VITALS
BODY MASS INDEX: 30.27 KG/M2 | HEART RATE: 74 BPM | WEIGHT: 160.2 LBS | SYSTOLIC BLOOD PRESSURE: 100 MMHG | DIASTOLIC BLOOD PRESSURE: 66 MMHG | TEMPERATURE: 98.3 F

## 2021-06-03 VITALS — BODY MASS INDEX: 31.02 KG/M2 | HEIGHT: 61 IN | WEIGHT: 164.31 LBS

## 2021-06-03 VITALS — BODY MASS INDEX: 29.69 KG/M2 | WEIGHT: 159.7 LBS

## 2021-06-03 VITALS — WEIGHT: 159 LBS | BODY MASS INDEX: 29.56 KG/M2

## 2021-06-03 VITALS — HEIGHT: 61 IN | WEIGHT: 160 LBS | BODY MASS INDEX: 30.21 KG/M2

## 2021-06-04 VITALS
TEMPERATURE: 98.5 F | WEIGHT: 154.44 LBS | DIASTOLIC BLOOD PRESSURE: 76 MMHG | RESPIRATION RATE: 20 BRPM | SYSTOLIC BLOOD PRESSURE: 117 MMHG | BODY MASS INDEX: 29.18 KG/M2 | HEART RATE: 86 BPM | OXYGEN SATURATION: 95 %

## 2021-06-04 VITALS — WEIGHT: 152 LBS | BODY MASS INDEX: 28.72 KG/M2

## 2021-06-04 NOTE — PROGRESS NOTES
Preoperative Exam    Scheduled Procedure: Right hand mass  Surgery Date:  12/20/19  Surgery Location: [facility] -[id number]  Surgeon:  Dr. Ramirez [place]    Assessment/Plan:     1. Encounter for preoperative examination for general surgical procedure    2. Mass of right hand    3. Migraine without aura and without status migrainosus, not intractable  Uses Imitrex and Ibuprofen as needed for migraine headaches.  Advised against Ibuprofen until after surgery.  - SUMAtriptan (IMITREX) 50 MG tablet; Take 1 tablet (50 mg total) by mouth every 2 (two) hours as needed for migraine. Up to 200 mg/day.  Dispense: 20 tablet; Refill: 1  - ibuprofen (ADVIL,MOTRIN) 600 MG tablet; Take 1 tablet (600 mg total) by mouth every 6 (six) hours as needed for pain.  Dispense: 30 tablet; Refill: 0     Surgical Procedure Risk: Low (reported cardiac risk generally < 1%)  Have you had prior anesthesia?: Yes  Have you or any family members had a previous anesthesia reaction:  No  Do you or any family members have a history of a clotting or bleeding disorder?: No  Cardiac Risk Assessment: no increased risk for major cardiac complications    APPROVAL GIVEN to proceed with proposed procedure, without further diagnostic evaluation    Please Note:  None    Functional Status: Independent  Patient plans to recover at home with family.     Subjective:      Mavis Connell is a 49 y.o. female who presents for a preoperative consultation.  Patient is scheduled for a removal of a mass on the palmar aspect of her right hand.  The mass has been there for over 1 year.  It causes her pain and N/T in the fingers.  She is right hand dominant.    Patient is s/p partial hysterectomy.    Requesting a refill of her Imitrex and Ibuprofen for migraine headaches.     Patient has some cold symptoms today including a mild cough and runny nose.  Denies any fever, ear pain, shortness of breath, wheezing, or CP.      All other systems reviewed and  are negative, other than those listed in the HPI.    Pertinent History  Do you have difficulty breathing or chest pain after walking up a flight of stairs: No  History of obstructive sleep apnea: No  Steroid use in the last 6 months: No  Frequent Aspirin/NSAID use: No  Prior Blood Transfusion: No  Prior Blood Transfusion Reaction: N/A  If for some reason prior to, during or after the procedure, if it is medically indicated, would you be willing to have a blood transfusion?:  There is no transfusion refusal.    Current Outpatient Medications   Medication Sig Dispense Refill     ibuprofen (ADVIL,MOTRIN) 600 MG tablet Take 1 tablet (600 mg total) by mouth every 6 (six) hours as needed for pain. 30 tablet 0     MULTIVITAMIN ORAL Take 1 tablet by mouth daily.       SUMAtriptan (IMITREX) 50 MG tablet Take 1 tablet (50 mg total) by mouth every 2 (two) hours as needed for migraine. Up to 200 mg/day. 20 tablet 0     No current facility-administered medications for this visit.         No Known Allergies    Patient Active Problem List   Diagnosis     Epigastric hernia     Migraine without aura and without status migrainosus, not intractable     Hypothyroidism due to Hashimoto's thyroiditis     Menorrhagia       Past Medical History:   Diagnosis Date     Anxiety      History of anesthesia complications      PONV (postoperative nausea and vomiting)     severe       Past Surgical History:   Procedure Laterality Date     CARDIAC SURGERY      Prolapsed valve as child      SECTION, CLASSIC       LAPAROSCOPIC TOTAL HYSTERECTOMY Bilateral 2019    Procedure: ROBOTIC TOTAL LAPAROSCOPIC HYSTERECTOMY BILATERAL SALPINGECTOMY, CYSTOSCOPY;  Surgeon: Va Beatty DO;  Location: [facility] [place];  Service: Gynecology       Social History     Socioeconomic History     Marital status: Single     Spouse name: Not on file     Number of children: Not on file     Years of education: Not on file     Highest education level: Not on  file   Occupational History     Not on file   Social Needs     Financial resource strain: Not on file     Food insecurity:     Worry: Not on file     Inability: Not on file     Transportation needs:     Medical: Not on file     Non-medical: Not on file   Tobacco Use     Smoking status: Never Smoker     Smokeless tobacco: Never Used   Substance and Sexual Activity     Alcohol use: Yes     Comment: occasional     Drug use: No     Sexual activity: Yes     Partners: Male     Birth control/protection: None   Lifestyle     Physical activity:     Days per week: Not on file     Minutes per session: Not on file     Stress: Not on file   Relationships     Social connections:     Talks on phone: Not on file     Gets together: Not on file     Attends Gnosticism service: Not on file     Active member of club or organization: Not on file     Attends meetings of clubs or organizations: Not on file     Relationship status: Not on file     Intimate partner violence:     Fear of current or ex partner: Not on file     Emotionally abused: Not on file     Physically abused: Not on file     Forced sexual activity: Not on file   Other Topics Concern     Not on file   Social History Narrative     Not on file       Patient Care Team:  Maryjane Palacios NP as PCP - General (Family Medicine)  Maryjane Palacios NP as Assigned PCP          Objective:     Vitals:    12/16/19 1340   BP: 100/66   Pulse: 74   Temp: 98.3  F (36.8  C)   Weight: 160 lb 3.2 oz (72.7 kg)   LMP: 07/30/2019         Physical Exam:  General Appearance: Alert, cooperative, no distress, appears stated age  Eyes: PERRL, conjunctiva/corneas clear, EOM's intact  Ears: Normal TM's and external ear canals, both ears  Nose: Nares normal, septum midline, mucosa normal, clear drainage  Throat: Lips, mucosa, and tongue normal; teeth and gums normal  Neck: Supple, symmetrical, trachea midline, no adenopathy;  thyroid: not enlarged, symmetric, no tenderness/mass/nodules; no carotid bruit  or JVD  Back: no CVA tenderness  Lungs: Clear to auscultation bilaterally, respirations unlabored. No rhonchi or wheezing.   Heart: Regular rate and rhythm, S1 and S2 normal, no murmur, rub, or gallop  Abdomen: Soft, non-tender, bowel sounds active all four quadrants, no masses, no organomegaly  Extremities: Extremities normal, atraumatic, no cyanosis or edema  Skin: Skin color, texture, turgor normal, no rashes  Lymph nodes: Cervical, supraclavicular nodes normal  Neurologic: Normal   Psychologic: appropriate affective, answers all of my questions appropriately. No hallucinations, delusion, or suicidal ideations.      Patient Instructions      Before Your Surgery       Call your surgeon if there is any change in your health. This includes signs of a cold or flu (such as a sore throat, runny nose, cough, rash or fever).       Do not smoke, drink alcohol or take over the counter medicine (unless your surgeon or primary care doctor tells you to) for the 24 hours before and after surgery.       If you take prescribed drugs: Follow your doctor s orders about which medicines to take and which to stop until after surgery.      Eating and drinking prior to surgery: follow the instructions from your surgeon.      Take a shower or bath the night before surgery. Use the soap your surgeon gave you to gently clean your skin. If you do not have soap from your surgeon, use your regular soap. Do not shave or scrub the surgery site. Wear clean pajamas and have clean sheets on your bed.             Hold all supplements, aspirin and NSAIDs for 7 days prior to surgery.    Follow your surgeon's direction on when to stop eating and drinking prior to surgery.    Your surgeon will be managing your pain after your surgery.      Remove all jewelry and metal piercings before your surgery.     Remove nail polish from fingers before surgery.      Labs:  No labs were ordered during this visit    Immunization History   Administered Date(s)  Administered     Influenza, inj, historic,unspecified 11/13/2018     Influenza,live, Nasal Laiv4 01/28/2015     Influenza,seasonal quad, PF, =/> 6months 10/17/2017     Tdap 11/26/2018           Electronically signed by Maryjane Palacios NP 12/16/19 1:37 PM

## 2021-06-08 NOTE — PROGRESS NOTES
"Mavis Connell is a 49 y.o. female who is being evaluated via a billable video visit.      The patient has been notified of following:     \"This video visit will be conducted via a call between you and your physician/provider. We have found that certain health care needs can be provided without the need for an in-person physical exam.  This service lets us provide the care you need with a video conversation.  If a prescription is necessary we can send it directly to your pharmacy.  If lab work is needed we can place an order for that and you can then stop by our lab to have the test done at a later time.    Video visits are billed at different rates depending on your insurance coverage. Please reach out to your insurance provider with any questions.    If during the course of the call the physician/provider feels a video visit is not appropriate, you will not be charged for this service.\"    Patient has given verbal consent to a Video visit? Yes    How would you like to obtain your AVS? AVS Preference: [employer].  Patient would like the video invitation sent by: Text to cell phone: [phone]    Will anyone else be joining your video visit? No        Video Start Time: 821     Patient presents via video visit with several concerns.  She was previously seen this week and started on treatment for Shingles.  This is going well.    Patient has been under a lot of stress the last 4 months.  She has been in and out of work due to issues stemming from a hand surgery to remove a mass earlier this year.  Patient is struggling with applying for disability and unemployment.  She has been required to work in the office versus remotely, which has been very frustrating for her.  Patient feels very stressed and anxious regarding her situation.  She plans on meeting with her HR department.  Patient reports that she has lost weight due to all the stress.  She is also currently dealing with Shingles.      Patient would also like to " recheck her thyroid.  Her last TSH check was 8.13.  She does have positive TPO antibodies.  Previously trialed on Levothyroxine, but did not tolerate secondary to dizziness, nausea, and headaches.  This was about 1 year ago.  Patient denies any specific weight gain, fatigue, hair loss, dry skin, or constipation.  If anything she is just feeling extremely anxious due to her work situation.     Additional provider notes: GENERAL: Healthy, alert and no distress  EYES: Eyes grossly normal to inspection. No discharge or erythema, or obvious scleral/conjunctival abnormalities.  RESP: No audible wheeze, cough, or visible cyanosis.  No visible retractions or increased work of breathing.    NEURO: Cranial nerves grossly intact. Mentation and speech appropriate for age.  PSYCH: Mentation appears normal, affect normal/bright, judgement and insight intact, normal speech and appearance well-groomed    PHQ-9 Total Score: 22 (6/18/2020  7:45 AM)    PETE 7 Total Score: 21 (6/18/2020  7:00 AM)    Assessment/Plan:   1. Hypothyroidism due to Hashimoto's thyroiditis  Will check a thyroid level prior to restarting any hormone replacement, although she is asymptomatic.  Will also consider starting a lower dose, as she had some problems when she previously took it.  - Thyroid Cascade; Future    2. Situational anxiety  Secondary to job stressors.  Agree with her meeting with HR to resolve some of these issues.  Patient agrees to counseling to discuss some of these issues; referral placed.  Trial of Hydroxyzine three times a day PRN for acute anxiety.  Discussed proper use and possible side effects of this medication including drowsiness.  - AMB REFERRAL TO MENTAL HEALTH AND ADDICTION  - Adult (18+); Outpatient Treatment; Individual/Couples/Family/Group Therapy/Health Psychology; [facility] [phone]  - hydrOXYzine HCL (ATARAX) 50 MG tablet; Take 1-2 tablets ( mg total) by mouth 3 (three) times a day  as needed for anxiety.  Dispense: 60 tablet; Refill: 0      Video-Visit Details    Type of service:  Video Visit    Video End Time (time video stopped): 8:49 AM  Originating Location (pt. Location): Home    Distant Location (provider location):  [facility] FAMILY MEDICINE/OB     Platform used for Video Visit: Daron Palacios NP

## 2021-06-08 NOTE — TELEPHONE ENCOUNTER
FNA triage call : Tested negative covid 6/5/20 .   Presenting problem :  Pt called.  [place] [employer] employee [employer] clinic message . Has new very painful  Blistery  rash  On back of left  Thigh - leg started today . Currently : 1&0 , eating and activity are all fine but unable to sit due to position of rash .  Pt was told today  By [facility]  Nurses that looks like  Shingles .   Guideline used : Covid 19 suspected A AH and then Shingles A oh.   Disposition and recommendations : sent to  for virtual visit and ask for ok for face to face visit if possible per Pt request  .   Caller verbalizes understanding and denies further.  questions and will call back if further symptoms to triage or questions  . Marilyn Mckeon RN  - [place] Nurse Advisor     Reason for Disposition    COVID-19 Testing, questions about    Shingles rash (matches SYMPTOMS) and onset within past 72 hours    Additional Information    Negative: SEVERE difficulty breathing (e.g., struggling for each breath, speaks in single words)    Negative: Difficult to awaken or acting confused (e.g., disoriented, slurred speech)    Negative: Bluish (or gray) lips or face now    Negative: Shock suspected (e.g., cold/pale/clammy skin, too weak to stand, low BP, rapid pulse)    Negative: Sounds like a life-threatening emergency to the triager    Negative: [1] COVID-19 exposure AND [2] no symptoms    Negative: COVID-19 and Breastfeeding, questions about    Negative: [1] Adult with possible COVID-19 symptoms AND [2] triager concerned about severity of symptoms or other causes    Negative: SEVERE or constant chest pain or pressure (Exception: mild central chest pain, present only when coughing)    Negative: MODERATE difficulty breathing (e.g., speaks in phrases, SOB even at rest, pulse 100-120)    Negative: Patient sounds very sick or weak to the triager    Negative: MILD difficulty breathing (e.g., minimal/no SOB at rest, SOB with walking, pulse <100)     Negative: Chest pain or pressure    Negative: Fever > 103 F (39.4 C)    Negative: [1] Fever > 101 F (38.3 C) AND [2] age > 60    Negative: [1] Fever > 100.0 F (37.8 C) AND [2] bedridden (e.g., nursing home patient, CVA, chronic illness, recovering from surgery)    Negative: HIGH RISK patient (e.g., age > 64 years, diabetes, heart or lung disease, weak immune system)    Negative: Fever present > 3 days (72 hours)    Negative: [1] Fever returns after gone for over 24 hours AND [2] symptoms worse or not improved    Negative: [1] Continuous (nonstop) coughing interferes with work or school AND [2] no improvement using cough treatment per protocol    Negative: [1] COVID-19 infection suspected by caller or triager AND [2] mild symptoms (cough, fever, or others) AND [3] no complications or SOB    Negative: Cough present > 3 weeks    Negative: [1] COVID-19 diagnosed by positive lab test AND [2] mild symptoms (e.g., cough, fever, others) AND [3] no complications or SOB    Negative: [1] COVID-19 diagnosed by HCP (doctor, NP or PA) AND [2] mild symptoms (e.g., cough, fever, others) AND [3] no complications or SOB    Negative: COVID-19 Home Isolation, questions about    Negative: Difficult to awaken or acting confused (e.g., disoriented, slurred speech)    Negative: Sounds like a life-threatening emergency to the triager    Negative: Localized rash and doesn't match the SYMPTOMS of shingles    Negative: Back pain and doesn't match the SYMPTOMS of shingles    Negative: Shingles Vaccine (Recombinant Zoster Vaccine; RZV; Shingrix), questions about    Negative: Shingles rash of face and eye pain or blurred vision    Negative: Shingles rash on the eyelid or tip of the nose    Negative: Shingles rash and spots start appearing other places on body    Negative: Patient sounds very sick or weak to the triager    Negative: Shingles rash (matches SYMPTOMS) and weak immune system (e.g., HIV positive,  cancer chemotherapy, chronic steroid  treatment, splenectomy) and NOT taking antiviral medication    Negative: Shingles rash of face and facial weakness    Negative: Shingles rash of face or ear and earache or ringing in the ear    Negative: Fever > 100.5 F (38.1 C)    Negative: SEVERE pain (e.g., excruciating)    [facility] Specific Disposition  - [facility] Specific Patient Instructions  COVID 19 Nurse Triage Plan/Patient Instructions    Please be aware that novel coronavirus (COVID-19) may be circulating in the community. If you develop symptoms such as fever, cough, or SOB or if you have concerns about the presence of another infection including coronavirus (COVID-19), please contact your health care provider or visit [email or web address].       Patient to schedule a Virtual Visit with provider. Reference Visit Selection Guide.    Thank you for taking steps to prevent the spread of this virus.  Limit your contact with others.  Wear a simple mask to cover your cough.  Wash your hands well and often.    Resources  [facility]: About COVID-19: [email or web address]  CDC: What to Do If You're Sick: www.cdc.gov/coronavirus/2019-ncov/about/steps-when-sick.html  CDC: Ending Home Isolation: www.cdc.gov/coronavirus/2019-ncov/hcp/disposition-in-home-patients.html   CDC: Caring for Someone: www.cdc.gov/coronavirus/2019-ncov/if-you-are-sick/care-for-someone.html   [facility]: Interim Guidance for Hospital Discharge to Home: www.health.[facility]/diseases/coronavirus/hcp/hospdischarge.pdf  [facility] clinical trials (COVID-19 research studies): [email or web address].[email or web address]/n-clinical-trials   Below are the COVID-19 hotlines at the [place] Department of Health ([facility]). Interpreters are available.   For health questions: Call 076-318-2150 or 1-464.302.8035 (7 a.m. to 7 p.m.)  For questions about schools and childcare: Call 435-376-5888 or 1-454.785.4952 (7 a.m. to 7 p.m.)    Protocols used: CORONAVIRUS (COVID-19) DIAGNOSED OR  UOCAUHQZP-J-HT 5.16.20, SHINGLES-A-[place]

## 2021-06-08 NOTE — TELEPHONE ENCOUNTER
New Appointment Needed  What is the reason for the visit:    Same Date/Next Day Appt Request  What is the reason for your visit?: Possible Shingles / painful blistery rash on left thigh   See triage notes   Provider Preference: PCP only or anyone   How soon do you need to be seen?: today. As soon as able.   Waitlist offered?: No  Okay to leave a detailed message:  Yes    Please call as soon as possible and advise whether or not Philip can see patient. She is also a [employer] employee.    PN: [phone]

## 2021-06-15 NOTE — PROGRESS NOTES
"Provider wore a mask during this visit.     Subjective:   Mavis Connell is a 47 y.o. female and is new to [facility].  Roomed by: Tati    Refills needed? No    Do you have any forms that need to be filled out? No      Chief Complaint   Patient presents with     Cough     started last 12/1     Sinusitis     headache   Also admits sore throat, body aches, hoarseness and chills started on 2/2. Denies any CP or SOB, but admits feeling rattling in chest. Admits stuffy and runny nose. Primary care is at [facility] in [facility]. Has been taking fluids. Appetite was down. Energy level has been down. Denies any vomiting or diarrhea. Has been taking ibuprofen for headache.   PMH - No chronic medical conditions  PSH - C- Section, and heart surgery age 6 for prolapsed valve  FX - HTN - mother, DM - mother type 2, daughter - Type 1, Cancer - Lung - father, CAD - neg  Review of Systems  Const - Resp - see HPI  No Known Allergies  No current outpatient prescriptions on file.  There is no problem list on file for this patient.    Medical History Reviewed  Objective:     Vitals:    02/05/18 1339   BP: 110/72   Patient Site: Right Arm   Patient Position: Sitting   Cuff Size: Adult Regular   Pulse: 80   Resp: 18   Temp: 98  F (36.7  C)   TempSrc: Oral   SpO2: 97%   Weight: 165 lb (74.8 kg)   Height: 5' 2\" (1.575 m)   Gen - Pt in NAD  Eyes - Conjunctiva non injected, no drainage  Face - non TTP over frontal sinus areas; non TTP over maxillary area  Ears - external canals - no induration, Right TM - not injected, Left TM - not injected   Nose - not congested, no nasal drainage  Pharynx - non injected, tonsils 1+ size  Neck - supple, no cervical adenopathy, no masses  Cor - RRR w/o murmur  Lungs - Good air entry; no wheezes or crackles noted on auscultation - no coughing noted  Skin - no lesions, no rashes noted    No results found for this visit on 02/05/18.  No results found.   Assessment - Plan   Medical Decision Making -47-year-old " woman presents with URI symptoms for 4 days.  Denies any chest pain or shortness of breath.No clinical findings indicative of serious bacterial infection, such as pneumonia, sinusitis or otitis media were ascertained from today's evaluation. Presentation and clinical findings are consistent with a viral process.    1. Viral syndrome    2. Acute laryngitis    At the conclusion of the encounter, assessment and plan were discussed. All questions were answered. The patient or guardian acknowledged understanding and was involved in the decision making regarding the overall care plan.    Patient Instructions   1. Keep well hydrated  2. May alternate Tylenol every 6 hours with ibuprofen every 6 hours as needed for fever or pain  3. If symptoms are not improving over the next 7-10 days, follow up with primary provider  4. If you have any questions, call the clinic number  - it's answered 24/7      Viral Syndrome     GENERAL INFORMATION:   What is viral syndrome? Viral syndrome is a term caregivers use for general symptoms of a viral infection that has no clear cause.   What are the signs and symptoms of viral syndrome? Signs and symptoms may start slowly or suddenly and last hours to days. They can be mild to severe and can change over days or hours.   Fever and chills, or a rash    Runny or stuffy nose     Cough, sore throat, or hoarseness     Headache, or pain and pressure around your eyes     Muscle aches and joint pain     Shortness of breath or wheezing     Abdominal pain, cramps, and diarrhea     Nausea, vomiting, or loss of appetite     Fatigue or feeling drained of energy  How is viral syndrome treated? An illness caused by a virus usually goes away in 10 to 14 days without treatment. The following medicines may be given to help manage your signs and symptoms:   Antipyretics: These reduce fever.    Antihistamines: These help relieve a rash, itching, and trouble breathing.     Decongestants: These decrease a stuffy  nose so that you can breathe more easily.     Antitussives: These help control a cough.     Antiviral medicine: These help kill the virus ( like influenza) and control symptoms.    What can I do to help prevent the spread of viral syndrome? Viruses are spread easily from person to person through the air and on shared items. You can spread a virus to other people for weeks after your symptoms go away. The following are ways to prevent the spread of a virus:   Wash your hands: Wash your hands often with soap and water or use an alcohol-based gel. Wash your hands after you touch someone who is sick.     Wear a mask: A mask can help you prevent the spread of a virus. If you need to wear a mask, ask your caregiver where to get one.     Cook and handle food properly: Cook food completely through. Clean food preparation surfaces with a disinfectant.     When should I contact my caregiver? Contact your caregiver if:   Your symptoms get worse after 5 to 7 days.     Your symptoms do not go away within 10 days.    You have thick drainage or pus coming out of 1 or both nostrils and pain in one side of your face.    You have a fever and pain.    You have green sputum.  When should I seek immediate care? Seek care immediately or call 911 if:   You have continued vomiting and diarrhea.    You have chest pain or trouble breathing.                               ,

## 2021-06-16 PROBLEM — E06.3 HYPOTHYROIDISM DUE TO HASHIMOTO'S THYROIDITIS: Status: ACTIVE | Noted: 2019-06-21

## 2021-06-16 PROBLEM — G43.009 MIGRAINE WITHOUT AURA AND WITHOUT STATUS MIGRAINOSUS, NOT INTRACTABLE: Status: ACTIVE | Noted: 2018-11-26

## 2021-06-16 PROBLEM — K43.9 EPIGASTRIC HERNIA: Status: ACTIVE | Noted: 2018-11-26

## 2021-06-16 NOTE — PROGRESS NOTES
Assessment:       Influenza like syndrome       Plan:      OTC analgesics discussed  Recommended plenty of fluids and good rest  Antitussives per orders  Trial of nasal steroid  Discussed signs of worsening infection and when to follow-up with PCP if no symptom improvement.     Patient Instructions   You were seen today for flu-like symptoms. You are contagious until your fever is gone for 24 hours. Maintain good hand hygiene, cover your cough, and limit contact to prevent spreading the illness. Symptoms typically last 1-2 weeks.    Symptom management:  - Drink plenty of fluids and allow for plenty of rest  - Use tylenol or ibuprofen every 6-8 hours as needed for fever/discomfort  - May use codeine cough syrup to help suppress the cough  - May use Flonase to help with sinus congestion    Reasons to be seen immediately for re-evaluation:  - Have trouble breathing or are short of breath  - Feel pain or pressure in your chest or belly  - Get suddenly dizzy  - Feel confused  - Have severe vomiting    If no symptom improvement in 1 week, follow-up with your primary care provider.        Subjective:       Mavis Connell is a 47 y.o. female who presents for evaluation of influenza like symptoms. Symptoms include cough, rib pain during the cough, body aches, voice change, and sore throat and have been present for 1 week. Patient was seen at [facility] 4 days ago and diagnosed with a viral syndrome with symptomatic treatment. She has tried to alleviate the symptoms with delsym, dayquil, nyquil, cough drops, tylenol, and advil with moderate relief. High risk factors for influenza complications: none. Patient denies fever.    The following portions of the patient's history were reviewed and updated as appropriate: allergies, current medications and problem list.    Review of Systems  Pertinent items are noted in HPI.     Allergies  No Known Allergies       Objective:       /66 (Patient Site: Right Arm, Patient Position: Sitting,  Cuff Size: Adult Regular)  Pulse 86  Temp 98.3  F (36.8  C) (Oral)   Resp 16  Wt 168 lb (76.2 kg)  LMP 01/15/2018  SpO2 97%  Breastfeeding? No  BMI 30.73 kg/m2  General appearance: alert, appears stated age, cooperative, no distress and non-toxic  Head: Normocephalic, without obvious abnormality, atraumatic, sinuses nontender to percussion  Ears: TM's intact with small amounts of mucoid fluid, no erythema, no bulging; external ears normal  Nose: no discharge, deviated septum  Throat: posterior oropharynx erythematous, no tonsils welling, MMM, lips and tongue normal  Neck: mild anterior cervical adenopathy and supple, symmetrical, trachea midline  Lungs: clear to auscultation bilaterally and no rhonchi, rales, or wheezing  Heart: regular rate and rhythm, S1, S2 normal, no murmur, click, rub or gallop

## 2021-06-17 NOTE — PATIENT INSTRUCTIONS - HE
Patient Instructions by Maryjane Palacios NP at 1/11/2019  9:00 AM     Author: Maryjane Palacios NP Service: -- Author Type: Nurse Practitioner    Filed: 1/11/2019  9:27 AM Encounter Date: 1/11/2019 Status: Signed    : Maryjane Palacios NP (Nurse Practitioner)       Patient Education     Raynaud Disease  Your healthcare provider has told you that you have Raynaud disease. It is also called Raynaud phenomenon or Raynaud syndrome. There is no cure for Raynaud disease, but you can manage it to help prevent attacks.    What are the symptoms of Raynaud disease?  A Raynaud disease attack is often triggered by cold or stress. During an attack, blood vessels suddenly narrow (called vasospasm).  This most often happens in fingers and toes. In rare cases, the nose, ears, or even tongue are affected. Narrowed blood vessels reduce the blood supply to the area. The area then turns white, then blue. The area may feel numb or painful. As the attack passes, the blood vessels open. The affected area may turn bright red as it warms up, then returns to normal color.  What is the cause of Raynaud disease?  With Raynaud disease, it is believed that blood vessels in the affected areas overrespond to certain triggers, such as cold. This makes them narrow (called vasospasm) much more than in people without the disease. What causes the blood vessels to react so strongly to certain triggers is unknown. In between attacks, the blood vessels are normal and healthy. Attacks dont permanently damage the blood vessels, but may thicken the artery walls.   In some cases, Raynaud disease happens along with another disease or condition. This is often a connective tissue disorder, such as lupus, scleroderma, or rheumatoid arthritis. This is called secondary Raynaud disease (as opposed to primary Raynaud disease discussed above) and may be more severe. If this is the case for you, you and your healthcare provider can discuss treatment for the  underlying condition.  What are the risk factors?  Risk factors for Raynaud disease include:    Women are more likely to get Raynaud disease than men.    Younger individuals are at higher risk, usually ages 15 to 30.    Living in colder climates increases risk.    Having a family member with Raynaud disease increases one's risk.    Underlying rheumatoid conditions may increase one's risk.   What are possible triggers?  Triggers for Raynaud disease include:     Cold    Stress    Caffeine    Smoking    Repetitive movements    Certain medicines, such as beta-blockers, migraine medicine, birth control pills and others    Injury  How is Raynaud disease diagnosed?  Your description of your symptoms, a health history, and a physical exam are often enough for a diagnosis. Blood tests and other tests may be done to see if any underlying conditions are present and rule out other problems.  How is Raynaud disease treated?  There is no cure for Raynaud disease. But you can control symptoms and reduce the number and severity of attacks. For most people, avoiding triggers is enough to limit attacks. Your healthcare provider may suggest the following:    Take precautions to help prevent your hands and feet from losing circulation. This includes:  ? Dressing warmly in cold weather.  ? Wear gloves or mittens when your hands may become cold, such as when you use the refrigerator or freezer.  ? Avoid stress and caffeine.  ? Exercise regularly. This may reduce the number and severity of attacks.   ? If you smoke, quitting may improve the condition. This is because smoking causes your blood vessels to narrow and reduces blood flow.    Soak your hands or feet in warm (not hot) water. Do this at the first sign of attack. Keep soaking until your skin color returns to normal.  In some people, symptoms are persistent or troubling. For these cases, other treatments are a choice. Your healthcare provider can tell you more about the  following:    Prescription medicines that relax and widen blood vessels, such as calcium channel blockers. These may help relieve symptoms.    Nerve surgery for severe cases that dont respond to other treatments. Surgery removes the nerves that surround the blood vessels in the hands and feet. Without nerve stimulation, the blood vessels stay more relaxed. They are less likely to become very narrow due to stimulus. Nerves may be blocked using injections in some cases.  Most cases of Raynaud disease are not cause for concern. The disease doesnt get worse and isnt likely to cause any permanent damage. If attacks are severe, very prolonged, or very often, skin damage may result. Controlling attacks can help prevent this.  When to seek medical care  The following problems happen rarely, but they can be serious. Call your healthcare provider right away if you notice any of the following:    Infection or sores on the skin    A finger or toe turns black    The skin breaks open on its own    A rash develops    A finger or toe joint becomes painful or swollen   Date Last Reviewed: 1/27/2016 2000-2017 [employer]. [address] [place] [address], [place] [address]. All rights reserved. This information is not intended as a substitute for professional medical care. Always follow your healthcare professional's instructions.

## 2021-06-18 NOTE — PATIENT INSTRUCTIONS - HE
Patient Instructions by Jacquelin Porter PA-C at 6/16/2020  4:00 PM     Author: Jacquelin Porter PA-C Service: -- Author Type: Physician Assistant    Filed: 6/16/2020  4:05 PM Encounter Date: 6/16/2020 Status: Signed    : Jacquelin Porter PA-C (Physician Assistant)       Patient Education     Shingles  Shingles is a viral infection caused by the same virus as chicken pox. Anyone who has had chicken pox may get shingles later in life. The virus stays in the body, but remains dormant (asleep). Shingles often occurs in older persons or persons with lowered immunity. But it can affect anyone at any age.  Shingles starts as a tingling patch of skin on one side of the body. Small, painful blisters may then appear. The rash does not spread to the rest of the body.  Exposure to shingles cannot cause shingles. However, it can cause chicken pox in anyone who has not had chicken pox or has not been vaccinated. The contagious period ends when all blisters have crusted over (generally about 2 weeks after the illness begins).  After the blisters heal, the affected skin may be sensitive or painful for months (neuralgia). This often gradually goes away.  A shingles vaccine is available. This can help prevent shingles or make it less painful. It is generally recommended for adults over the age of 60 who have had chicken pox in the past, but who have never had shingles. Adults over 60 who have had neither chicken pox nor shingles can prevent both diseases with the chicken pox vaccine. Ask your healthcare provider about these vaccines.  Home care    Medicines may be prescribed to help relieve pain. Take these medicines as directed. Ask your healthcare provider or pharmacist before using over-the-counter medicines for helping treat pain and itching.    In certain cases, antiviral medicines may be prescribed to reduce pain, shorten the illness, and prevent neuralgia. Take these medicines as  directed.    Compresses made from a solution of cool water mixed with cornstarch or baking soda may help relieve pain and itching.     Gently wash skin daily with soap and water to help prevent infection.  Be certain to rinse off all of the soap, which can be irritating.    Trim fingernails and try not to scratch. Scratching the sores may leave scars.    Stay home from work or school until all blisters have formed a crust and you are no longer contagious.  Follow-up care  Follow up with your healthcare provider or as directed by our staff.  When to seek medical advice    Fever of 100.4 F (38 C) or higher, or as directed by your healthcare provider    Affected skin is on the face or neck and any of the following occur:  ? Headache  ? Eye pain  ? Changes in vision  ? Sores near the eye  ? Weakness of facial muscles    Pain, redness, or swelling of a joint    Signs of skin infection: colored drainage from the sores, warmth, increasing redness, or increasing pain  Date Last Reviewed: 9/25/2015 2000-2017 [employer]. [address] [place] [address], [place] [address]. All rights reserved. This information is not intended as a substitute for professional medical care. Always follow your healthcare professional's instructions.

## 2021-06-19 NOTE — LETTER
Letter by Maryjane Palacios NP at      Author: Maryjane Palacios NP Service: -- Author Type: --    Filed:  Encounter Date: 7/15/2019 Status: (Other)         July 15, 2019     Patient: Mavis Connell   YOB: 1970   Date of Visit: 7/15/2019       To Whom it May Concern:    Mavis Connell was seen in my clinic on 7/15/2019.    If you have any questions or concerns, please don't hesitate to call.    Sincerely,         Electronically signed by Maryjane Palacios NP

## 2021-06-19 NOTE — LETTER
Letter by Ary Espinal FNP at      Author: Ary Espinal FNP Service: -- Author Type: --    Filed:  Encounter Date: 5/28/2019 Status: (Other)         May 28, 2019     Patient: Mavis Connell   YOB: 1970   Date of Visit: 5/28/2019       To Whom It May Concern:    Mavis Connell was seen in my clinic on 5/28/2019. It is my medical opinion that Mavis Connell may return to work on 5/29/2019 with the follow ing accommodations. Able to take 5 to 10 minutes break every couple of hours during her shift for the rest of this week.       If you have any questions or concerns, please don't hesitate to call.    Sincerely,        Electronically signed by WILLY Urbina

## 2021-06-19 NOTE — PROGRESS NOTES
ASSESSMENT:   1. Tension headache         PLAN:  Mavis Connell is a 47 y.o. female who presents to the clinic today for evaluation of headache that started 3 days ago. It was described as gradual onset to the occipital region.     Differential diagnosis for HA includes migraine headache, tension headache, viral syndrome, intracranial tumor/bleed/mass, SAH, hypertensive headache, Increased ICP (pseudotumor cerebri), meningitis, sinusitis, temporal arteritis.       I do not believe this patient has an intracranial tumor likely given she has no neurological deficits and the pain is not worse at night.      Postural changes cause the headache to remain the same.    There are no visual changes associated with the headache nor tinnitus that would suggest pseudotumor cerebri.     I do not believe this patient has a SAH as the HA was not of sudden onset. It came on gradually over several She is not hypertensive.      I do not believe this patient has a HA related to meningitis as she does not have fever, feel ill or have ménageal signs on exam.     I do not believe that the HA is from sinusitis as the patient has no rhinorrhea or nasal congestion an no pain on palpation over sinuses.     I do not believe the patient has temporal Arteritis.  There is no pain over the temporal arteries and no proximal muscle weakness or pain.    Based on history, exam and diagnostic testing done on this visit, the most likely etiology of this patient's HA is tension type HA.   Tolerating oral intake and appropriate for outpatient management with tylenol and ibuprofen, given one dose SQ imitrex in clinic with good relief. and clinic follow up. Patient expressed understanding and was comfortable with the plan for discharge.  They will see their PCP within one week for follow-up.  They will return to clinic or ER if symptoms worsen or do not improve.       I discussed red flag symptoms, return precautions to clinic/ER and follow up care with  patient/parent.  Expected clinical course, symptomatic cares advised. Questions answered. Patient/parent amenable with plan.    Patient Instructions:  Patient Instructions   Please return with any worsening.  Please make an appointment with primary care provider to discuss these headaches.       SUBJECTIVE:   Mavis Connell is a 47 y.o. female who presents today with headache to the occipital region of her head for 3 days. Has been getting these headaches for the past 10 months since going through her divorce, but no previous migraine history.  This headache was of gradual onset, no fevers or chills.  No photosensitivity.  No vomiting.  No dizziness.  No weakness. No URI sx.  Has taken tylenol and ibuprofen without relief.  Has taken a cousin's imitrex with good relief in the past.  He does note that she has determined a pattern in her headaches, and that they come on with increased stress.  Notes she has been having difficulty with her ex- regarding division of assets, and this headache came on shortly after an exchange with him. Continues to eat and drink normally.       ROS:  Comprehensive 12 pt ROS completed, positives noted in HPI, otherwise negative.      Past Medical History:  There is no problem list on file for this patient.      Surgical History:  No past surgical history on file.        Family History:  No family history on file.    Reviewed; Non-contributory    History   Smoking Status     Never Smoker   Smokeless Tobacco     Never Used       Current Medications:  Current Outpatient Prescriptions on File Prior to Visit   Medication Sig Dispense Refill     acetaminophen (TYLENOL) 500 MG tablet Take 500 mg by mouth every 6 (six) hours as needed for pain.       ibuprofen (ADVIL,MOTRIN) 200 MG tablet Take 200 mg by mouth every 6 (six) hours as needed for pain.       No current facility-administered medications on file prior to visit.        Allergies:   No Known Allergies    OBJECTIVE:   Vitals:     07/05/18 1503   BP: 122/88   Patient Site: Right Arm   Patient Position: Sitting   Cuff Size: Adult Regular   Pulse: 86   Resp: 18   Temp: 98.1  F (36.7  C)   TempSrc: Oral   SpO2: 98%   Weight: 158 lb 3.2 oz (71.8 kg)     Physical exam reveals a pleasant 47 y.o. female.   General Appearance:  Alert, cooperative, no distress, appears stated age. Afebrile.  Integument: Warm, dry, no rashes or lesions.  HEENT:  Head: Atraumatic, normocephalic. No cranial bone tenderness. Face nontraumatic. No TTP over temporal arteries.  Eyes: Conjunctiva clear, Lids normal. PERRL.   Nose: nares patent. No erythema of nasal mucosa. No rhinorrhea. No sinus tenderness.  Neck: Supple. No Cspine tenderness.  Respiratory: No distress. Lungs clear to ausculation bilaterally. No crackles, wheezes, rhonchi or stridor.  Cardiovascular:: Regular rate, regular rhythm. No murmurs, rubs, clicks or gallops. No obvious chest wall deformities. Peripheral pulses 2+ bilaterally. No peripheral edema.  GI: Soft, nontender, normal bowel sounds. No masses, organomegaly, rigidity, or guarding.  Musculoskeletal: No swelling or erythema of extremities. Moves all extremities equally. Back: no Tspine or Lspine tenderness.   Neurologic: Alert & oriented to person, place and time. Normal tone. PERRL. Normal speech, no dysarthria.  CN II-XII grossly intact. Motor: RUE/LUE  strength 5/5 bilaterally, elbow flexion/extension 5/5 bilaterally, shoulder elevation 5/5 bilaterally. RLE/LLE knee flexion/extension 5/5 bilaterally, ankle plantar/dorsiflexion 5/5 bilaterally. No pronator drift. Sensory: intact distally  Gait: Normal, no assistive devices. Assistance of one. Psychomotor slowing (-). Abnormal Movements (-).Rapid alternating Movements intact. Finger nose finger intact. Heel to shin normal bilaterally.  Psych: Normal mood and affect.       RADIOLOGY    none  LABORATORY STUDIES    none      Betty Zapata, MEAGHAN

## 2021-06-20 NOTE — LETTER
Letter by Jacquelin Porter PA-C at      Author: Jacquelin Porter PA-C Service: -- Author Type: --    Filed:  Encounter Date: 6/16/2020 Status: (Other)         June 16, 2020     Patient: Mavis Connell   YOB: 1970   Date of Visit: 6/16/2020       To Whom it May Concern:    Mavis Connell was seen in my clinic on 6/16/2020 for shingles. Please allow her to work from home through 6/19/20.    If you have any questions or concerns, please don't hesitate to call.    Sincerely,         Electronically signed by Jacquelin Porter PA-C, MING

## 2021-06-20 NOTE — LETTER
Letter by Gregorio Harkins MD at      Author: Gregorio Harkins MD Service: -- Author Type: --    Filed:  Encounter Date: 6/16/2020 Status: (Other)                   Dear Mavis Connell ,    Our records indicate that you missed your appointment with Provider Dr. Gregorio Harkins on 6/16/2020. We understand if this was due to unforeseen circumstances or a simple oversight.      In the future when you reschedule your next visit, we do ask that you honor you appointment time with your provider since this is valuable time set aside for your healthcare.       If for any reason you must cancel or change your appointment, it is important that you give our clinic at least 24 hours' notice to offer that spot to another patient.   We also ask that you show up 10-15 minutes early to all appointments in order to update any necessary information.        We are very interested in your health care and hope to hear from you soon.  If you have any questions,  Please contact our clinic at [phone].

## 2021-06-20 NOTE — LETTER
Letter by Jacquelin Porter PA-C at      Author: Jacquelin Porter PA-C Service: -- Author Type: --    Filed:  Encounter Date: 6/16/2020 Status: (Other)         June 16, 2020     Patient: Mavis Connell   YOB: 1970   Date of Visit: 6/16/2020       To Whom It May Concern:    It is my medical opinion that Mavis Connell should be excused from work through 6/19/20 due to the location of her rash (shingles).    If you have any questions or concerns, please don't hesitate to call.    Sincerely,        Electronically signed by Jacquelin Porter PA-C, PA-C

## 2021-06-21 NOTE — PROGRESS NOTES
FEMALE PREVENTATIVE EXAM    Assessment and Plan:       1. Routine health maintenance    2. Visit for screening mammogram  - Mammo Screening Bilateral; Future    3. Screening for diabetes mellitus  - Basic Metabolic Panel; Future    4. Screening for lipid disorders  - Lipid [place] FASTING; Future    5. Screening for malignant neoplasm of cervix  - Gynecologic Cytology (PAP Smear)    6. Screen for STD (sexually transmitted disease)  - Chlamydia trachomatis & Neisseria gonorrhoeae, Amplified Detection    7. Migraine without aura and without status migrainosus, not intractable  Trial patient on oral Imitrex, as she has found this helpful in past.  Discussed proper use and possible side effects of this medication.  May utilize with Ibuprofen.  Labs pending.  Note written for standing desk at work.     - Thyroid Cascade; Future  - HM2(CBC w/o Differential); Future  - Vitamin D, Total (25-Hydroxy); Future  - SUMAtriptan (IMITREX) 50 MG tablet; Take 1 tablet (50 mg total) by mouth every 2 (two) hours as needed for migraine Up to 200 mg/day..  Dispense: 20 tablet; Refill: 0     8. Epigastric hernia  Non-painful and fully reducible.  Consider surgery referral if this becomes larger or painful.     9. Digital mucous cyst of right hand  Not bothersome at this time.  Will let me know if she would like to see a hand surgeon to discuss removal.        Next follow up:  Return in about 1 year (around 11/26/2019) for Physical.    Immunization Review  Adult Imm Review: Due today, orders placed    I discussed the following with the patient:   Adult Healthy Living: Importance of regular exercise  Healthy nutrition  Contraception options    I have had an Advance Directives discussion with the patient.    Subjective:   Chief Complaint: Mavis Connell is an 48 y.o. female here for a preventative health visit.     HPI:  Patient is recently  with an adult daughter.  Work at [employer] here at [employer].  Previously seen at  [facility] in [place].  ARY has been for sent records.    Menstrual periods have been irregular for the last year.  She does endorse new hot flashes.  Menstrual cycle started today.  She is due for a Pap.  No history of abnormals.    No history of mammogram screening.    Patient complains of \"migraine\" headaches.  Symptoms have been worse in the last year or so.  Endorses intermittent headaches that start at the base of her neck, and will radiate up towards her temples.  There has not been a significant pattern with her headaches.  She does get associated nausea and sensitivity to light.  Advil is helpful if she takes it immediately upon onset of headaches.  She was seen at walk-in [facility] this summer and given an injection of Imitrex.  She found this helpful.  Patient also tried some oral Imitrex given to her by cousin, and how this helps as well.  She sits the majority of the day at work, and is requesting that she is able to get a standing desk.  She feels that this would be beneficial.    Complains of a bump/cyst on the palm of her right hand. Sometimes gives her pain when she is using her mouse at work.  No paresthesias in the hand.  Does not appear to be getting bigger.     Healthy Habits  Are you taking a daily aspirin? No  Do you typically exercising at least 40 min, 3-4 times per week?  NO  Do you usually eat at least 4 servings of fruit and vegetables a day, include whole grains and fiber and avoid regularly eating high fat foods? Yes  Have you had an eye exam in the past two years? Yes  Do you see a dentist twice per year? NO  Do you have any concerns regarding sleep? YES    Safety Screen  If you own firearms, are they secured in a locked gun cabinet or with trigger locks? Patient does no own any firearms  Do you feel you are safe where you are living?: Yes (11/26/2018  3:07 PM)  Do you feel you are safe in your relationship(s)?: Yes (11/26/2018  3:07 PM)      Review of Systems:  Please see " "above.  The rest of the review of systems are negative for all systems.     Pap History:   No - age 30-65 PAP every 3 years recommended  Cancer Screening       Status Date      MAMMOGRAM Overdue 1971     PAP SMEAR Next Due 11/26/2023      Done 11/26/2018           Patient Care Team:  Maryjane Palacios NP as PCP - General (Family Medicine)        History     Reviewed By Date/Time Sections Reviewed    Maryjane Palacios NP 11/26/2018  3:26 PM Tobacco    Colette Graham LPN 11/26/2018  3:06 PM Tobacco            Objective:   Vital Signs:   Visit Vitals  /84 (Patient Site: Right Arm, Patient Position: Sitting, Cuff Size: Adult Regular)   Pulse 77   Ht 5' 1.5\" (1.562 m)   Wt 156 lb 1.6 oz (70.8 kg)   LMP 10/13/2018 (Approximate)   SpO2 97%   BMI 29.02 kg/m           PHYSICAL EXAM  General Appearance: Alert, cooperative, no distress, appears stated age  Head: Normocephalic, without obvious abnormality, atraumatic  Eyes: PERRL, conjunctiva/corneas clear, EOM's intact  Ears: Normal TM's and external ear canals, both ears  Nose: Nares normal, septum midline  Throat: Lips, mucosa, and tongue normal; teeth and gums normal  Neck: Supple, symmetrical, trachea midline, no adenopathy;  thyroid: not enlarged, symmetric, no tenderness/mass/nodules; no carotid bruit or JVD  Lungs: Clear to auscultation bilaterally, respirations unlabored  Breasts: No breast masses, tenderness, asymmetry, or nipple discharge.  Heart: Regular rate and rhythm, S1 and S2 normal, no murmur, rub, or gallop. Vertical surgical scar to chest.  Abdomen: Soft, non-tender, bowel sounds active all four quadrants, no organomegaly. Small, reducible epigastric hernia.   Pelvic: Normally developed genitalia with no external lesions or eruptions. Vagina and cervix show no lesions, inflammation, discharge or tenderness. Bleeding secondary to menstrual cycle. No cystocele, No rectocele. No adnexal mass or tenderness.  Extremities: Extremities normal, " atraumatic, no cyanosis or edema. Soft, nontender, mass to palm of right hand.   Skin: Skin color, texture, turgor normal, no rashes or lesions  Lymph nodes: Cervical, supraclavicular, and axillary nodes normal  Neurologic: Normal   Psychologic: appropriate affective, answers all of my questions appropriately. No hallucinations, delusion, or suicidal ideations.    Maryjane Palacios, RADHA-C

## 2021-06-22 NOTE — TELEPHONE ENCOUNTER
Referral Request  Type of referral: Hand Surgeon  Who s requesting: Patient  Why the request: Right hand pain and discomfort. Patient has swelling and discomfort in her middle and pointer finger.  Have you been seen for this request: Yes   Addressed at appointment dated 11/26/2018.  Does patient have a preference on a group/provider? No   Okay to leave a detailed message?  Yes

## 2021-06-23 NOTE — TELEPHONE ENCOUNTER
Patient Returning Call  Reason for call:  Patient  Information relayed to patient:  Let patient know below information and appt scheduled.  Patient has additional questions:  Yes  If YES, what are your questions/concerns:  Patient is unable to make appt today at 1:40 PM. Can Maryjane Palacios NP accommodate patient later today, around 3:30/4 PM or tomorrow anytime.  Otherwise please contact patient anytime today via telephone to discuss over the phone.   Okay to leave a detailed message?: Yes     Writer did not cancel appt that is scheduled for 1:40 PM yet.

## 2021-06-23 NOTE — PROGRESS NOTES
Assessment/Plan:     1. Raynaud's disease without gangrene  I suspect that symptoms are consistent with Raynaud's.  I discussed this diagnosis with the patient in length.  No significant pain or injury indicating a fracture.  Sensation intact.  I do not think this has anything to do with the palpable cyst on the palm of her hand.  We did previously discuss a orthopedic referral for this, but I do not think this is urgent at this time.  Encouraged keeping the hand warm, especially when going outside.  Demonstrated how to assess for capillary refill.     2. Migraine without aura and without status migrainosus, not intractable  Symptoms improved with Imitrex.  She has only had 1 migraine headache since I saw her last, which is a good improvement.  Continue the Imitrex.  I do not see any indication for FMLA at this time.        Subjective:     Mavis Connell is a 48 y.o. female who presents with concerns regarding discoloration and swelling of her fingers.  Patient woke up about 1 week ago with a swollen and bruised-appearing right, ring finger.  Symptoms resolved throughout the day.  Later that day, she developed significant swelling and discoloration to her right, middle finger.  No injury or fall.  Patient did not have any pain or numbness/tingling in the fingers or hand.  She is not a smoker.  No problems with  strength.  Symptoms have gradually resolved.  She continues to have some slight discoloration to the right middle finger, which interestingly, stops at the distal joint.  No treatments tried at home.  She is unsure if symptoms got worse when she was outside in the cold.  Patient has a cyst on the palm of her right hand that she has had for some time.  She is wondering if this is causing problems - no pain, it has not changed in size.    Patient was previously seen for migraine headaches.  At our last visit, she was prescribed Imitrex.  This has been of great benefit, and she tells me she has only had 1  migraine headache since I saw her last.  Patient's employer is wondering if she needs FMLA, as she was late to work many times last year due to headaches.        The following portions of the patient's history were reviewed and updated as appropriate: allergies, current medications    Review of Systems  A comprehensive review of systems was performed and was otherwise negative    Objective:     /80   Pulse 78   Wt 158 lb 12.8 oz (72 kg)   LMP 11/26/2018 (Exact Date)   BMI 29.52 kg/m      General Appearance: Alert, cooperative, no distress, appears stated age  Extremities: Right, 3rd finger is slightly ecchymotic. No pain or swelling. Capillary refill < 3 seconds. Gross sensation intact.  strength equal bilaterally. Normal ROM.      HUGO DiorC

## 2021-06-24 NOTE — TELEPHONE ENCOUNTER
It certainly could be, but I would think unlikely.  Certainly if her menstrual periods are continually lasting that long, it would be concerning.  I would encourage an appointment if this is the case.    Maryjane Palacios NP

## 2021-06-24 NOTE — TELEPHONE ENCOUNTER
Who is calling:  Patient  Reason for Call:  Patient stated she's had her menstrual cycle for twelve days. Patient is questioning if her iron level could be low due to the length of time she has had her menstrual cycle. Please reach out to patient and advise.  Date of last appointment with primary care: 01/11/2019  Has the patient been recently seen:  Yes  Okay to leave a detailed message: Yes

## 2021-06-26 ENCOUNTER — HEALTH MAINTENANCE LETTER (OUTPATIENT)
Age: 51
End: 2021-06-26

## 2021-06-29 NOTE — PROGRESS NOTES
Progress Notes by Jacquelin Porter PA-C at 2020  4:00 PM     Author: Jacquelin Porter PA-C Service: -- Author Type: Physician Assistant    Filed: 2020  4:45 PM Encounter Date: 2020 Status: Signed    : Jacquelin Porter PA-C (Physician Assistant)       Chief Complaint   Patient presents with   ? Rash     Back on the Right Leg x this morning      HPI:  Mavis Connell is a 49 y.o. female who complains of rash to L posterior thigh onset this AM. Rash is painful, especially when she touches it. Pt denies contacts with anyone with a similar rash. No new medications, soaps, detergents, lotions, foods, or other products. She had chickenpox as a child. No treatments tried. Symptoms are moderate in severity & constant in duration. Denies throat/tongue swelling, CP, SOB, abd pain, N/VD, sore throat, and any other symptoms.   Problem List:  2019: Menorrhagia  2019: Hypothyroidism due to Hashimoto's thyroiditis  2018: Epigastric hernia  2018: Migraine without aura and without status migrainosus, not   intractable    Past Medical History:   Diagnosis Date   ? Anxiety    ? History of anesthesia complications    ? PONV (postoperative nausea and vomiting)     severe     Past Surgical History:   Procedure Laterality Date   ? CARDIAC SURGERY      Prolapsed valve as child   ?  SECTION, CLASSIC     ? LAPAROSCOPIC TOTAL HYSTERECTOMY Bilateral 2019    Procedure: ROBOTIC TOTAL LAPAROSCOPIC HYSTERECTOMY BILATERAL SALPINGECTOMY, CYSTOSCOPY;  Surgeon: Va Beatty DO;  [place] [facility] [place]  Service: Gynecology     Social History     Tobacco Use   ? Smoking status: Never Smoker   ? Smokeless tobacco: Never Used   Substance Use Topics   ? Alcohol use: Yes     Comment: occasional     Review of Systems   Constitutional: Negative for chills and fever.   Respiratory: Negative for shortness of breath.    Cardiovascular: Negative for chest pain.   Gastrointestinal:  Negative for abdominal pain, diarrhea, nausea and vomiting.   Skin: Positive for rash.   All other systems reviewed and are negative.    Vitals:    06/16/20 1555   BP: 117/76   Patient Site: Right Arm   Patient Position: Sitting   Cuff Size: Adult Regular   Pulse: 86   Resp: 20   Temp: 98.5  F (36.9  C)   TempSrc: Oral   SpO2: 95%   Weight: 154 lb 7 oz (70.1 kg)     Physical Exam   Constitutional: She appears well-developed and well-nourished.  Non-toxic appearance.   HENT:   Head: Normocephalic and atraumatic.   Cardiovascular: Normal rate, regular rhythm and normal heart sounds.   Pulmonary/Chest: Effort normal and breath sounds normal.   Neurological: She is alert.   Skin: Skin is warm and dry. Rash noted. Rash is vesicular.        Psychiatric: She has a normal mood and affect.     Labs:  No results found for this or any previous visit (from the past 24 hour(s)).    Radiology:  No results found.    Clinical Decision Making:  C/w herpes zoster; Rx acyclovir. Note given excusing pt from work as she reports it is painful to sit in her chair at work.    At the end of the encounter, I discussed results, diagnosis, medications. Discussed red flags for immediate return to clinic/ER, as well as indications for follow up if no improvement. Patient understood and agreed to plan. Patient was stable for discharge.  1. Herpes zoster without complication  acyclovir (ZOVIRAX) 800 MG tablet     Return in about 1 week (around 6/23/2020) for Follow up w/ primary care provider if not improved.?  NUPUR Rich, PA-C  [facility]

## 2021-07-03 NOTE — ANESTHESIA PREPROCEDURE EVALUATION
Anesthesia Preprocedure Evaluation by Tessa Napier MD at 8/6/2019  8:33 AM     Author: Tessa Napier MD Service: -- Author Type: Physician    Filed: 8/6/2019  9:28 AM Date of Service: 8/6/2019  8:33 AM Status: Addendum    : Tessa Napier MD (Physician)    Related Notes: Original Note by Tessa Napier MD (Physician) filed at 8/6/2019  9:23 AM       Anesthesia Evaluation      History of anesthetic complications (severe PONV)     Airway   Mallampati: I  Neck ROM: full   Pulmonary - negative ROS    breath sounds clear to auscultation                         Cardiovascular   Exercise tolerance: > or = 4 METS  (+) valvular problems/murmurs (Hx of PS s/p valvuloplasty as infant ), ,     Rhythm: regular  Rate: normal,         Neuro/Psych    (+) anxiety/panic attacks,     Comments: Migraine HA    Endo/Other    (+) hypothyroidism (off synthroid, not well tolerated),   (-) no obesity     GI/Hepatic/Renal    (+) GERD,             Dental                             Anesthesia Plan  Planned anesthetic: general endotracheal and total IV anesthesia  GETA  TIVA- BIS monitor   Scop patch, decadron 10, zofran 4, propofol TIVA for antiemesis  Ketamine 35mg post-induction for post op pain  Toradol 15mg at case closure if cleared by surgeon  ASA 2   Induction: intravenous   Anesthetic plan and risks discussed with: patient and child/children  Anesthesia plan special considerations: antiemetics,   Post-op plan: routine recovery

## 2021-07-03 NOTE — ADDENDUM NOTE
Addendum Note by Maryjane Palacios NP at 6/18/2019  8:40 AM     Author: Maryjane Palacios NP Service: -- Author Type: Nurse Practitioner    Filed: 6/20/2019  4:57 PM Encounter Date: 6/18/2019 Status: Signed    : Maryjane Palacios NP (Nurse Practitioner)    Addended by: MARYJANE PALACIOS on: 6/20/2019 04:57 PM        Modules accepted: Orders

## 2021-10-11 ENCOUNTER — HEALTH MAINTENANCE LETTER (OUTPATIENT)
Age: 51
End: 2021-10-11

## 2022-07-17 ENCOUNTER — HEALTH MAINTENANCE LETTER (OUTPATIENT)
Age: 52
End: 2022-07-17

## 2022-08-07 NOTE — PROGRESS NOTES
.   Bilateral foot swelling first noticed by parent today. No known injury or changes in medications. Parent states a family history of diabetes.

## 2022-09-25 ENCOUNTER — HEALTH MAINTENANCE LETTER (OUTPATIENT)
Age: 52
End: 2022-09-25

## 2023-01-26 ENCOUNTER — NURSE TRIAGE (OUTPATIENT)
Dept: NURSING | Facility: CLINIC | Age: 53
End: 2023-01-26

## 2023-01-26 NOTE — TELEPHONE ENCOUNTER
Writer relayed PCP's recommendation.     Writer assisted pt in scheduling appt on 01/27/2023.    Monie Mendoza RN

## 2023-01-26 NOTE — TELEPHONE ENCOUNTER
Mavis just made an appointment as she has an abdominal hernia.  Patient has had hernia since she was 6 years old.  Patient is having pain when she bends over and not is protruding and starting to bother her.  Patient also feels that she needs a blood draw for thyroid.  Patient denies severe abdominal pain and denies hernia is tender to touch and denies vomiting.  Denies constant abdominal pain and present greater than 2 hours.  Patient can reduce hernia.  Patient is requesting to be seen in clinic.  Patient states that pain is coming daily when bending over and is almost severe.  Patient please phone patient.  Nurse Triage SBAR    Is this a 2nd Level Triage? YES, LICENSED PRACTITIONER REVIEW IS REQUIRED    Situation:   Abdominal pain, abdominal hernia    Background:   Patient states that she has an abdominal hernia and has been present for years.  Lately hernia has been painful when she bends over.  Pain is happening daily and Mavis feels that she may need to have hernia surgery.      Assessment:   Patient is requesting to be seen in clinic but appointment is scheduled too far out and patient cannot wait that long.  Denies fever, vomiting.    Protocol Recommended Disposition:   See in Office Today or Tomorrow    Recommendation:   Clinic please phone patient.     Routed to provider    Does the patient meet one of the following criteria for ADS visit consideration? 16+ years old, with an [facility] PCP     TIP  Providers, please consider if this condition is appropriate for management at one of our Acute and Diagnostic Services sites.     If patient is a good candidate, please use dotphrase <dot>triageresponse and select Refer to ADS to document.          Reason for Disposition    Patient wants to be seen    Additional Information    Negative: SEVERE abdominal pain    Negative: Hernia is painful or tender to touch    Negative: Vomiting and can't reduce the hernia    Negative: Vomiting and abdomen looks much more swollen than  usual    Negative: Vomiting and hernia is more painful or swollen than usual    Negative: Swollen lump in groin and pulsating (like heartbeat)    Negative: Patient sounds very sick or weak to the triager    Negative: Constant abdominal pain and present > 2 hours (NO pain or tenderness of hernia)    Negative: Can't reduce the hernia (NO pain, local tenderness, or vomiting)    Protocols used: HERNIA-A-OH

## 2023-01-26 NOTE — TELEPHONE ENCOUNTER
Please schedule patient an appointment with me.  OK to use a hold spot.  If her abdominal pain becomes more severe, then I recommend urgent or emergent care.    Maryjane Palacios NP

## 2023-01-27 ENCOUNTER — OFFICE VISIT (OUTPATIENT)
Dept: FAMILY MEDICINE | Facility: CLINIC | Age: 53
End: 2023-01-27

## 2023-01-27 VITALS
OXYGEN SATURATION: 95 % | SYSTOLIC BLOOD PRESSURE: 128 MMHG | DIASTOLIC BLOOD PRESSURE: 80 MMHG | TEMPERATURE: 98 F | BODY MASS INDEX: 35.07 KG/M2 | HEIGHT: 62 IN | HEART RATE: 80 BPM | WEIGHT: 190.6 LBS | RESPIRATION RATE: 16 BRPM

## 2023-01-27 DIAGNOSIS — Z12.31 VISIT FOR SCREENING MAMMOGRAM: ICD-10-CM

## 2023-01-27 DIAGNOSIS — Z11.59 NEED FOR HEPATITIS C SCREENING TEST: ICD-10-CM

## 2023-01-27 DIAGNOSIS — Z12.11 SCREEN FOR COLON CANCER: ICD-10-CM

## 2023-01-27 DIAGNOSIS — K43.9 EPIGASTRIC HERNIA: Primary | ICD-10-CM

## 2023-01-27 DIAGNOSIS — Z11.4 SCREENING FOR HIV (HUMAN IMMUNODEFICIENCY VIRUS): ICD-10-CM

## 2023-01-27 DIAGNOSIS — E06.3 HYPOTHYROIDISM DUE TO HASHIMOTO'S THYROIDITIS: ICD-10-CM

## 2023-01-27 LAB
ALBUMIN SERPL BCG-MCNC: 4.8 G/DL (ref 3.5–5.2)
ALP SERPL-CCNC: 58 U/L (ref 35–104)
ALT SERPL W P-5'-P-CCNC: 22 U/L (ref 10–35)
ANION GAP SERPL CALCULATED.3IONS-SCNC: 14 MMOL/L (ref 7–15)
AST SERPL W P-5'-P-CCNC: 33 U/L (ref 10–35)
BILIRUB SERPL-MCNC: 0.4 MG/DL
BUN SERPL-MCNC: 19.2 MG/DL (ref 6–20)
CALCIUM SERPL-MCNC: 9.9 MG/DL (ref 8.6–10)
CHLORIDE SERPL-SCNC: 102 MMOL/L (ref 98–107)
CREAT SERPL-MCNC: 0.84 MG/DL (ref 0.51–0.95)
DEPRECATED HCO3 PLAS-SCNC: 25 MMOL/L (ref 22–29)
ERYTHROCYTE [DISTWIDTH] IN BLOOD BY AUTOMATED COUNT: 13 % (ref 10–15)
GFR SERPL CREATININE-BSD FRML MDRD: 84 ML/MIN/1.73M2
GLUCOSE SERPL-MCNC: 92 MG/DL (ref 70–99)
HCT VFR BLD AUTO: 37.6 % (ref 35–47)
HGB BLD-MCNC: 12.7 G/DL (ref 11.7–15.7)
MCH RBC QN AUTO: 32.8 PG (ref 26.5–33)
MCHC RBC AUTO-ENTMCNC: 33.8 G/DL (ref 31.5–36.5)
MCV RBC AUTO: 97 FL (ref 78–100)
PLATELET # BLD AUTO: 289 10E3/UL (ref 150–450)
POTASSIUM SERPL-SCNC: 4.9 MMOL/L (ref 3.4–5.3)
PROT SERPL-MCNC: 7.7 G/DL (ref 6.4–8.3)
RBC # BLD AUTO: 3.87 10E6/UL (ref 3.8–5.2)
SODIUM SERPL-SCNC: 141 MMOL/L (ref 136–145)
T4 FREE SERPL-MCNC: 0.22 NG/DL (ref 0.9–1.7)
TSH SERPL DL<=0.005 MIU/L-ACNC: 138 UIU/ML (ref 0.3–4.2)
WBC # BLD AUTO: 7.2 10E3/UL (ref 4–11)

## 2023-01-27 PROCEDURE — 36415 COLL VENOUS BLD VENIPUNCTURE: CPT | Performed by: PHYSICIAN ASSISTANT

## 2023-01-27 PROCEDURE — 84439 ASSAY OF FREE THYROXINE: CPT | Performed by: PHYSICIAN ASSISTANT

## 2023-01-27 PROCEDURE — 80053 COMPREHEN METABOLIC PANEL: CPT | Performed by: PHYSICIAN ASSISTANT

## 2023-01-27 PROCEDURE — 99214 OFFICE O/P EST MOD 30 MIN: CPT | Performed by: PHYSICIAN ASSISTANT

## 2023-01-27 PROCEDURE — 87389 HIV-1 AG W/HIV-1&-2 AB AG IA: CPT | Performed by: PHYSICIAN ASSISTANT

## 2023-01-27 PROCEDURE — 84443 ASSAY THYROID STIM HORMONE: CPT | Performed by: PHYSICIAN ASSISTANT

## 2023-01-27 PROCEDURE — 86803 HEPATITIS C AB TEST: CPT | Performed by: PHYSICIAN ASSISTANT

## 2023-01-27 PROCEDURE — 85027 COMPLETE CBC AUTOMATED: CPT | Performed by: PHYSICIAN ASSISTANT

## 2023-01-27 ASSESSMENT — PATIENT HEALTH QUESTIONNAIRE - PHQ9
SUM OF ALL RESPONSES TO PHQ QUESTIONS 1-9: 8
SUM OF ALL RESPONSES TO PHQ QUESTIONS 1-9: 8
10. IF YOU CHECKED OFF ANY PROBLEMS, HOW DIFFICULT HAVE THESE PROBLEMS MADE IT FOR YOU TO DO YOUR WORK, TAKE CARE OF THINGS AT HOME, OR GET ALONG WITH OTHER PEOPLE: VERY DIFFICULT

## 2023-01-27 NOTE — PATIENT INSTRUCTIONS
Please schedule an appointment with the surgeon   We will contact you with lab results  Follow up in 6-8 weeks, sooner if there are any worsening symptoms

## 2023-01-27 NOTE — PROGRESS NOTES
"  Assessment & Plan     Screen for colon cancer    - Colonoscopy Screening  Referral; Future    Screening for HIV (human immunodeficiency virus)  - HIV Antigen Antibody Combo; Future  - HIV Antigen Antibody Combo    Need for hepatitis C screening test  - Hepatitis C Screen Reflex to HCV RNA Quant and Genotype; Future  - Hepatitis C Screen Reflex to HCV RNA Quant and Genotype    Visit for screening mammogram  - MA SCREENING DIGITAL BILAT - Future  (s+30); Future    Hypothyroidism due to Hashimoto's thyroiditis  - TSH with free T4 reflex; Future  - TSH with free T4 reflex    Epigastric hernia    - Comprehensive metabolic panel (BMP + Alb, Alk Phos, ALT, AST, Total. Bili, TP); Future  - CBC with platelets; Future  - Adult General Surg Referral; Future  - Comprehensive metabolic panel (BMP + Alb, Alk Phos, ALT, AST, Total. Bili, TP)  - CBC with platelets    Patient Instructions   Please schedule an appointment with the surgeon   We will contact you with lab results  Follow up in 6-8 weeks, sooner if there are any worsening symptoms    Ordering of each unique test         BMI:   Estimated body mass index is 34.86 kg/m  as calculated from the following:    Height as of this encounter: 1.575 m (5' 2\").    Weight as of this encounter: 86.5 kg (190 lb 9.6 oz).   Weight management plan: check lab work today         - Check labs    Return in 6 weeks (on 3/10/2023), or if symptoms worsen or fail to improve, for Follow up.    [facility]brano [facility]    Toma Gamble is a 52 year old, presenting for the following health issues:  Abdominal Pain (Had heart surgery when she was [age], has always had a hernia; however, the hernia is protruding more and has pain associated with it.  )      History of Present Illness       Hypothyroidism:     Since last visit, patient describes the following symptoms::  Anxiety, Depression, Dry skin, Fatigue, Loose stools and Weight gain    Weight " "gain::  16-20 lbs.    Reason for visit:  Abdominal hernia    She eats 2-3 servings of fruits and vegetables daily.She consumes 1 sweetened beverage(s) daily.She exercises with enough effort to increase her heart rate 9 or less minutes per day.  She exercises with enough effort to increase her heart rate 3 or less days per week.   She is taking medications regularly.    Today's PHQ-9         PHQ-9 Total Score: 8    PHQ-9 Q9 Thoughts of better off dead/self-harm past 2 weeks :   Not at all    How difficult have these problems made it for you to do your work, take care of things at home, or get along with other people: Very difficult     Hernia as a result of cardiac surgery age 6, has gotten more painful over the past 3 months, has pain with lifting or bending. Notes she is moving so has been doing a lot of lifting and wonders if she triggered the increase in pain.   Currently having pain 3-4 times daily, previously had been only painful every couple of weeks.  Has gained about 50 lbs in the past year.  Denies fevers, chills, body aches, recent illness.  Denies vomiting, does feel nauseated.      Hypothyroid- was started on 75 mcg synthroid but didn't like the way it made her feel so stopped it in 2020, had a hysterectomy and hand surgery that year and never got back in for follow up.  She is willing to go back on synthroid if needed again          Review of Systems   CONSTITUTIONAL: NEGATIVE for fever, chills, change in weight  ENT/MOUTH: NEGATIVE for ear, mouth and throat problems  RESP: NEGATIVE for significant cough or SOB  CV: NEGATIVE for chest pain, palpitations or peripheral edema  GI: positive  for nausea, abdominal pain,negative for heartburn, or change in bowel habits       Objective    /80 (BP Location: Left arm, Patient Position: Sitting, Cuff Size: Adult Regular)   Pulse 80   Temp 98  F (36.7  C) (Oral)   Resp 16   Ht 1.575 m (5' 2\")   Wt 86.5 kg (190 lb 9.6 oz)   LMP  (LMP Unknown)   SpO2 " 95%   BMI 34.86 kg/m    Body mass index is 34.86 kg/m .  Physical Exam   GENERAL: healthy, alert and no distress  NECK: no adenopathy, no asymmetry, masses, or scars and thyroid normal to palpation  RESP: lungs clear to auscultation - no rales, rhonchi or wheezes  CV: regular rate and rhythm, normal S1 S2, no S3 or S4, no murmur, click or rub, no peripheral edema and peripheral pulses strong  ABDOMEN: soft, nontender, no hepatosplenomegaly, no masses and bowel sounds normal + reducible epigastric hernia  MS: no gross musculoskeletal defects noted, no edema

## 2023-01-30 LAB
HCV AB SERPL QL IA: NONREACTIVE
HIV 1+2 AB+HIV1 P24 AG SERPL QL IA: NONREACTIVE

## 2023-01-31 ENCOUNTER — TELEPHONE (OUTPATIENT)
Dept: FAMILY MEDICINE | Facility: CLINIC | Age: 53
End: 2023-01-31

## 2023-01-31 ENCOUNTER — HOSPITAL ENCOUNTER (OUTPATIENT)
Facility: HOSPITAL | Age: 53
End: 2023-01-31
Attending: SURGERY | Admitting: SURGERY

## 2023-01-31 ENCOUNTER — OFFICE VISIT (OUTPATIENT)
Dept: SURGERY | Facility: CLINIC | Age: 53
End: 2023-01-31
Attending: PHYSICIAN ASSISTANT

## 2023-01-31 VITALS
SYSTOLIC BLOOD PRESSURE: 124 MMHG | HEIGHT: 62 IN | WEIGHT: 192 LBS | BODY MASS INDEX: 35.33 KG/M2 | DIASTOLIC BLOOD PRESSURE: 68 MMHG

## 2023-01-31 DIAGNOSIS — K43.2 VENTRAL INCISIONAL HERNIA: Primary | ICD-10-CM

## 2023-01-31 DIAGNOSIS — K43.9 EPIGASTRIC HERNIA: ICD-10-CM

## 2023-01-31 DIAGNOSIS — E06.3 HYPOTHYROIDISM DUE TO HASHIMOTO'S THYROIDITIS: Primary | ICD-10-CM

## 2023-01-31 PROCEDURE — 99203 OFFICE O/P NEW LOW 30 MIN: CPT | Performed by: SURGERY

## 2023-01-31 RX ORDER — CEFAZOLIN SODIUM/WATER 2 G/20 ML
2 SYRINGE (ML) INTRAVENOUS SEE ADMIN INSTRUCTIONS
Status: CANCELLED | OUTPATIENT
Start: 2023-02-03

## 2023-01-31 RX ORDER — HEPARIN SODIUM 5000 [USP'U]/.5ML
5000 INJECTION, SOLUTION INTRAVENOUS; SUBCUTANEOUS
Status: CANCELLED | OUTPATIENT
Start: 2023-02-03

## 2023-01-31 RX ORDER — CEFAZOLIN SODIUM/WATER 2 G/20 ML
2 SYRINGE (ML) INTRAVENOUS
Status: CANCELLED | OUTPATIENT
Start: 2023-02-03

## 2023-01-31 RX ORDER — LEVOTHYROXINE SODIUM 50 UG/1
50 TABLET ORAL DAILY
Qty: 90 TABLET | Refills: 1 | Status: SHIPPED | OUTPATIENT
Start: 2023-01-31 | End: 2023-10-06

## 2023-01-31 NOTE — TELEPHONE ENCOUNTER
Called and spoke to pt,  Discussed importance of starting the synthroid again, started at low dose of 50 mcg, if tolerating pt will increase to 2 tabs q am before breakfast or at night, 4 hours after last meal.  Pt agrees, will follow up 4 weeks for tsh recheck.  Follow up in clinic if symptoms are not improving, ED if symptoms worsen or new symptoms begin    Martina Paris PA-C

## 2023-01-31 NOTE — TELEPHONE ENCOUNTER
S-(situation): Patient needs Pre-Op ASAP  (open to any provider- would like scheduled today).    B-(background): Abdominal hernia Repair scheduled for Friday 2/3/23    A-(assessment):     R-(recommendations): Please assist with scheduling with PCP OR Team Member.    Please contact Patient.  [phone].

## 2023-01-31 NOTE — TELEPHONE ENCOUNTER
LMTCB. Please assist with scheduling patient in. Either today over the virtual spot or she has a few openings tomorrow.

## 2023-01-31 NOTE — PROGRESS NOTES
General Surgery H&P  Mavis Connell MRN# [id number]   Age/Sex: 52 year old female YOB: 1970     Reason for visit: 1. Epigastric hernia            Referring physician: Martina Prais PA-C                   Assessment and Plan:   Assessment:  1.  Incisional ventral hernia located in the epigastric region.  Hernia measures approximately 5 x 6 cm  2.  History of open heart surgery as a child  3.  Robotic assisted hysterectomy  4.  Hypoparathyroidism    The patient has developed incisional ventral hernia located in the epigastric region.  The hernia is located right over her previous incision site which is likely the area where she had her chest tubes placed after her open heart surgery as a child.  Given that it is causing the patient abdominal pain, my recommendation is to proceed with surgery    Plan:  -Offering the patient procedure of robotic assisted laparoscopic repair of incisional ventral hernia with mesh.  - The risks and benefits of the procedure were explained detail to the patient. The risks include infection, bleeding, damage to the surrounding structures. Patient verbalized understanding provided consent to undergo the procedure above.  -Patient will require medical clearance prior to proceeding with surgery.  -Due to the hypoparathyroidism, I will have the patient reach back out to her primary care team to correct and fully optimize the hypoparathyroidism before surgery.  I have explained to the patient that any major surgery with a hypoparathyroid state would leave her at this for complications from the hyperparathyroidism.  The patient is in agreement.          Chief Complaint:     Chief Complaint   Patient presents with     Hernia     Epigastric hernia, has had since age 6; pain and discomfort has increased. No images        History is obtained from the patient    HPI:   Mavis Connell is a 52 year old female who presents to the general surgery team with abdominal pain in the epigastric region.   Patient states that this is been there for multiple years ever since she was a young child.  This did occur after the patient had her open heart surgery.  The patient states that it has been enlarging in size.  It is also been causing her discomfort.  Patient states that she has no nausea no vomiting.  No difficulty with bowel movements.  She has no further complaints at this time.  Patient does admit to history of robotic assisted hysterectomy.          Past Medical History:     Past Medical History:   Diagnosis Date     Anxiety      History of anesthesia complications      PONV (postoperative nausea and vomiting)     severe              Past Surgical History:     Past Surgical History:   Procedure Laterality Date     CARDIAC SURGERY      Prolapsed valve as child      SECTION       LAPAROSCOPIC HYSTERECTOMY TOTAL Bilateral 2019    Procedure: ROBOTIC TOTAL LAPAROSCOPIC HYSTERECTOMY BILATERAL SALPINGECTOMY, CYSTOSCOPY;  Surgeon: Va Beatty DO;  Location: [facility];  Service: Gynecology             Social History:    reports that she has never smoked. She has never used smokeless tobacco. She reports current alcohol use. She reports that she does not use drugs.           Family History:     Family History   Problem Relation Age of Onset     Coronary Artery Disease Mother      Diabetes Mother      Lung Cancer Father      Chronic Obstructive Pulmonary Disease Maternal Grandfather               Allergies:   No Known Allergies           Medications:     Prior to Admission medications    Medication Sig Start Date End Date Taking? Authorizing Provider   ibuprofen (ADVIL,MOTRIN) 600 MG tablet [IBUPROFEN (ADVIL,MOTRIN) 600 MG TABLET] Take 1 tablet (600 mg total) by mouth every 6 (six) hours as needed for pain. 19  Yes Maryjane Palacios NP   MULTIVITAMIN ORAL [MULTIVITAMIN ORAL] Take 1 tablet by mouth daily. 18  Yes Provider, Historical   SUMAtriptan (IMITREX) 50 MG tablet [SUMATRIPTAN  "(IMITREX) 50 MG TABLET] Take 1 tablet (50 mg total) by mouth every 2 (two) hours as needed for migraine. Up to 200 mg/day. 12/16/19  Yes Maryajne Palacios NP              Review of Systems:   A 12 point Review of Systems is negative other than noted in the HPI            Physical Exam:     Patient Vitals for the past 24 hrs:   BP Height Weight   01/31/23 0954 124/68 1.575 m (5' 2\") 87.1 kg (192 lb)        [unfilled]   Constitutional:   awake, alert, cooperative, no apparent distress, and appears stated age       Eyes:   PERRL, conjunctiva/corneas clear, EOM's intact; no scleral edema or icterus noted        ENT:   Normocephalic, without obvious abnormality, atraumatic, Lips, mucosa, and tongue normal        Hematologic / Lymphatic:   No lymphadenopathy       Lungs:   Normal respiratory effort, no accessory muscle use       Cardiovascular:   Regular rate and rhythm       Abdomen:   Soft, nondistended, the hernia is located in the epigastric region.  He does measure approximate 5 x 6 cm.  It is mildly tender to palpation.  It is reducible.  The hernia is located directly under the previous skin incision.       Musculoskeletal:   No obvious swelling, bruising or deformity       Skin:   Skin color and texture normal for patient, no rashes or lesions              Data:            DO Major Steward DO  General Surgeon  [facility]  Surgery [address] [address]?  Office: [phone]  Employed by - [employer]  Pager: [phone]         "

## 2023-01-31 NOTE — TELEPHONE ENCOUNTER
Please call.    Can schedule with me today at 4:40 - OK to use virtual appointment or other hold spot.  Otherwise, will need to look at other available providers.    Maryjane Palacios NP

## 2023-01-31 NOTE — TELEPHONE ENCOUNTER
01/31/23  Pt called asking for a call back to discuss her thyroid results and to discuss next steps. Pt was supposed to have abdominal hernia surgery on Friday 02/03 but due to Thyroid results, this was canceled. Patient is wondering about next steps and medication.

## 2023-01-31 NOTE — LETTER
1/31/2023         RE: Mavis Connell  [address] [place] [address]        Dear Colleague,    Thank you for referring your patient, Mavis Connell, to the [facility]. Please see a copy of my visit note below.    General Surgery H&P  Mavis Connell MRN# [id number]   Age/Sex: 52 year old female YOB: 1970     Reason for visit: 1. Epigastric hernia            Referring physician: Martina Paris PA-C                   Assessment and Plan:   Assessment:  1.  Incisional ventral hernia located in the epigastric region.  Hernia measures approximately 5 x 6 cm  2.  History of open heart surgery as a child  3.  Robotic assisted hysterectomy    The patient has developed incisional ventral hernia located in the epigastric region.  The hernia is located right over her previous incision site which is likely the area where she had her chest tubes placed after her open heart surgery as a child.  Given that it is causing the patient abdominal pain, my recommendation is to proceed with surgery    Plan:  -Offering the patient procedure of robotic assisted laparoscopic repair of incisional ventral hernia with mesh.  - The risks and benefits of the procedure were explained detail to the patient. The risks include infection, bleeding, damage to the surrounding structures. Patient verbalized understanding provided consent to undergo the procedure above.  -Patient will require medical clearance prior to proceeding with surgery.          Chief Complaint:     Chief Complaint   Patient presents with     Hernia     Epigastric hernia, has had since age 6; pain and discomfort has increased. No images        History is obtained from the patient    HPI:   Mavis Connell is a 52 year old female who presents to the general surgery team with abdominal pain in the epigastric region.  Patient states that this is been there for multiple years ever since she was a young child.  This did  occur after the patient had her open heart surgery.  The patient states that it has been enlarging in size.  It is also been causing her discomfort.  Patient states that she has no nausea no vomiting.  No difficulty with bowel movements.  She has no further complaints at this time.  Patient does admit to history of robotic assisted hysterectomy.          Past Medical History:     Past Medical History:   Diagnosis Date     Anxiety      History of anesthesia complications      PONV (postoperative nausea and vomiting)     severe              Past Surgical History:     Past Surgical History:   Procedure Laterality Date     CARDIAC SURGERY      Prolapsed valve as child      SECTION       LAPAROSCOPIC HYSTERECTOMY TOTAL Bilateral 2019    Procedure: ROBOTIC TOTAL LAPAROSCOPIC HYSTERECTOMY BILATERAL SALPINGECTOMY, CYSTOSCOPY;  Surgeon: aV Beatty DO;  Location: [facility];  Service: Gynecology             Social History:    reports that she has never smoked. She has never used smokeless tobacco. She reports current alcohol use. She reports that she does not use drugs.           Family History:     Family History   Problem Relation Age of Onset     Coronary Artery Disease Mother      Diabetes Mother      Lung Cancer Father      Chronic Obstructive Pulmonary Disease Maternal Grandfather               Allergies:   No Known Allergies           Medications:     Prior to Admission medications    Medication Sig Start Date End Date Taking? Authorizing Provider   ibuprofen (ADVIL,MOTRIN) 600 MG tablet [IBUPROFEN (ADVIL,MOTRIN) 600 MG TABLET] Take 1 tablet (600 mg total) by mouth every 6 (six) hours as needed for pain. 19  Yes Maryjane Palacios NP   MULTIVITAMIN ORAL [MULTIVITAMIN ORAL] Take 1 tablet by mouth daily. 18  Yes Provider, Historical   SUMAtriptan (IMITREX) 50 MG tablet [SUMATRIPTAN (IMITREX) 50 MG TABLET] Take 1 tablet (50 mg total) by mouth every 2 (two) hours as needed for migraine.  "Up to 200 mg/day. 12/16/19  Yes Maryjane Palacios NP              Review of Systems:   A 12 point Review of Systems is negative other than noted in the HPI            Physical Exam:     Patient Vitals for the past 24 hrs:   BP Height Weight   01/31/23 0954 124/68 1.575 m (5' 2\") 87.1 kg (192 lb)        [unfilled]   Constitutional:   awake, alert, cooperative, no apparent distress, and appears stated age       Eyes:   PERRL, conjunctiva/corneas clear, EOM's intact; no scleral edema or icterus noted        ENT:   Normocephalic, without obvious abnormality, atraumatic, Lips, mucosa, and tongue normal        Hematologic / Lymphatic:   No lymphadenopathy       Lungs:   Normal respiratory effort, no accessory muscle use       Cardiovascular:   Regular rate and rhythm       Abdomen:   Soft, nondistended, the hernia is located in the epigastric region.  He does measure approximate 5 x 6 cm.  It is mildly tender to palpation.  It is reducible.  The hernia is located directly under the previous skin incision.       Musculoskeletal:   No obvious swelling, bruising or deformity       Skin:   Skin color and texture normal for patient, no rashes or lesions              Data:            DO Major Steward DO  General Surgeon  [facility]  Surgery [facility] - [address] [address]?  Office: [phone]  Employed by - [employer]  Pager: [phone]             Again, thank you for allowing me to participate in the care of your patient.        Sincerely,        Major Newberry DO    "

## 2023-02-01 ENCOUNTER — DOCUMENTATION ONLY (OUTPATIENT)
Dept: SURGERY | Facility: CLINIC | Age: 53
End: 2023-02-01

## 2023-02-01 NOTE — PROGRESS NOTES
Surgery was canceled due to high TSH- patient is aware of the cancellation for 2/3. She will need to have clearance by her PCP prior to rescheduling .    JNS notified

## 2023-02-22 ENCOUNTER — MYC MEDICAL ADVICE (OUTPATIENT)
Dept: FAMILY MEDICINE | Facility: CLINIC | Age: 53
End: 2023-02-22

## 2023-02-24 ENCOUNTER — TELEPHONE (OUTPATIENT)
Dept: FAMILY MEDICINE | Facility: CLINIC | Age: 53
End: 2023-02-24

## 2023-02-24 DIAGNOSIS — E06.3 HYPOTHYROIDISM DUE TO HASHIMOTO'S THYROIDITIS: Primary | ICD-10-CM

## 2023-02-24 NOTE — TELEPHONE ENCOUNTER
Spoke to pt, she notes she is feeling better since st starting the Synthroid feels she has lost about 10 pounds and feels well overall. She would like to recheck her thyroid levels at this time rather than waiting 2 weeks she is anxious to get her surgery over. Will place orders for thyroid labs to be done as a lab only visit patient will call to schedule    Martina Paris PA-C

## 2023-03-23 PROBLEM — F41.8 SITUATIONAL ANXIETY: Status: ACTIVE | Noted: 2017-02-24

## 2023-08-05 ENCOUNTER — HEALTH MAINTENANCE LETTER (OUTPATIENT)
Age: 53
End: 2023-08-05

## 2024-03-22 ENCOUNTER — VIRTUAL VISIT (OUTPATIENT)
Dept: FAMILY MEDICINE | Facility: CLINIC | Age: 54
End: 2024-03-22
Payer: MEDICAID

## 2024-03-22 DIAGNOSIS — F32.1 CURRENT MODERATE EPISODE OF MAJOR DEPRESSIVE DISORDER WITHOUT PRIOR EPISODE (H): ICD-10-CM

## 2024-03-22 DIAGNOSIS — E06.3 HYPOTHYROIDISM DUE TO HASHIMOTO'S THYROIDITIS: Primary | ICD-10-CM

## 2024-03-22 PROCEDURE — 99214 OFFICE O/P EST MOD 30 MIN: CPT | Mod: 95 | Performed by: NURSE PRACTITIONER

## 2024-03-22 RX ORDER — ESCITALOPRAM OXALATE 10 MG/1
10 TABLET ORAL DAILY
Qty: 90 TABLET | Refills: 0 | Status: SHIPPED | OUTPATIENT
Start: 2024-03-22 | End: 2024-07-23

## 2024-03-22 RX ORDER — LEVOTHYROXINE SODIUM 50 UG/1
50 TABLET ORAL DAILY
Qty: 90 TABLET | Refills: 0 | Status: SHIPPED | OUTPATIENT
Start: 2024-03-22 | End: 2024-04-24

## 2024-03-22 ASSESSMENT — PATIENT HEALTH QUESTIONNAIRE - PHQ9
10. IF YOU CHECKED OFF ANY PROBLEMS, HOW DIFFICULT HAVE THESE PROBLEMS MADE IT FOR YOU TO DO YOUR WORK, TAKE CARE OF THINGS AT HOME, OR GET ALONG WITH OTHER PEOPLE: VERY DIFFICULT
SUM OF ALL RESPONSES TO PHQ QUESTIONS 1-9: 23
SUM OF ALL RESPONSES TO PHQ QUESTIONS 1-9: 23
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
## 2024-04-23 ENCOUNTER — OFFICE VISIT (OUTPATIENT)
Dept: FAMILY MEDICINE | Facility: CLINIC | Age: 54
End: 2024-04-23
Payer: COMMERCIAL

## 2024-04-23 VITALS
WEIGHT: 185 LBS | RESPIRATION RATE: 16 BRPM | HEART RATE: 84 BPM | OXYGEN SATURATION: 98 % | DIASTOLIC BLOOD PRESSURE: 80 MMHG | SYSTOLIC BLOOD PRESSURE: 110 MMHG | BODY MASS INDEX: 33.84 KG/M2 | TEMPERATURE: 97.7 F

## 2024-04-23 DIAGNOSIS — Z13.1 SCREENING FOR DIABETES MELLITUS: ICD-10-CM

## 2024-04-23 DIAGNOSIS — Z13.220 SCREENING FOR LIPID DISORDERS: ICD-10-CM

## 2024-04-23 DIAGNOSIS — F32.1 CURRENT MODERATE EPISODE OF MAJOR DEPRESSIVE DISORDER WITHOUT PRIOR EPISODE (H): ICD-10-CM

## 2024-04-23 DIAGNOSIS — Z86.79 HISTORY OF PULMONARY VALVE STENOSIS: ICD-10-CM

## 2024-04-23 DIAGNOSIS — E06.3 HYPOTHYROIDISM DUE TO HASHIMOTO'S THYROIDITIS: Primary | ICD-10-CM

## 2024-04-23 DIAGNOSIS — K43.2 VENTRAL INCISIONAL HERNIA: ICD-10-CM

## 2024-04-23 LAB
ALBUMIN SERPL BCG-MCNC: 4.6 G/DL (ref 3.5–5.2)
ALP SERPL-CCNC: 68 U/L (ref 40–150)
ALT SERPL W P-5'-P-CCNC: 45 U/L (ref 0–50)
ANION GAP SERPL CALCULATED.3IONS-SCNC: 10 MMOL/L (ref 7–15)
AST SERPL W P-5'-P-CCNC: 36 U/L (ref 0–45)
BILIRUB SERPL-MCNC: 0.3 MG/DL
BUN SERPL-MCNC: 13.8 MG/DL (ref 6–20)
CALCIUM SERPL-MCNC: 9.2 MG/DL (ref 8.6–10)
CHLORIDE SERPL-SCNC: 106 MMOL/L (ref 98–107)
CHOLEST SERPL-MCNC: 242 MG/DL
CREAT SERPL-MCNC: 0.78 MG/DL (ref 0.51–0.95)
DEPRECATED HCO3 PLAS-SCNC: 28 MMOL/L (ref 22–29)
EGFRCR SERPLBLD CKD-EPI 2021: >90 ML/MIN/1.73M2
FASTING STATUS PATIENT QL REPORTED: NO
GLUCOSE SERPL-MCNC: 91 MG/DL (ref 70–99)
HDLC SERPL-MCNC: 64 MG/DL
LDLC SERPL CALC-MCNC: 150 MG/DL
NONHDLC SERPL-MCNC: 178 MG/DL
POTASSIUM SERPL-SCNC: 4.7 MMOL/L (ref 3.4–5.3)
PROT SERPL-MCNC: 7.4 G/DL (ref 6.4–8.3)
SODIUM SERPL-SCNC: 144 MMOL/L (ref 135–145)
T4 FREE SERPL-MCNC: 0.71 NG/DL (ref 0.9–1.7)
TRIGL SERPL-MCNC: 142 MG/DL
TSH SERPL DL<=0.005 MIU/L-ACNC: 57.2 UIU/ML (ref 0.3–4.2)

## 2024-04-23 PROCEDURE — 84439 ASSAY OF FREE THYROXINE: CPT | Performed by: NURSE PRACTITIONER

## 2024-04-23 PROCEDURE — 84443 ASSAY THYROID STIM HORMONE: CPT | Performed by: NURSE PRACTITIONER

## 2024-04-23 PROCEDURE — 80053 COMPREHEN METABOLIC PANEL: CPT | Performed by: NURSE PRACTITIONER

## 2024-04-23 PROCEDURE — 36415 COLL VENOUS BLD VENIPUNCTURE: CPT | Performed by: NURSE PRACTITIONER

## 2024-04-23 PROCEDURE — 99214 OFFICE O/P EST MOD 30 MIN: CPT | Performed by: NURSE PRACTITIONER

## 2024-04-23 PROCEDURE — 80061 LIPID PANEL: CPT | Performed by: NURSE PRACTITIONER

## 2024-04-23 RX ORDER — AMOXICILLIN 500 MG/1
2000 CAPSULE ORAL ONCE
Qty: 4 CAPSULE | Refills: 0 | Status: SHIPPED | OUTPATIENT
Start: 2024-04-23 | End: 2024-04-23

## 2024-04-23 ASSESSMENT — ANXIETY QUESTIONNAIRES
GAD7 TOTAL SCORE: 19
7. FEELING AFRAID AS IF SOMETHING AWFUL MIGHT HAPPEN: MORE THAN HALF THE DAYS
7. FEELING AFRAID AS IF SOMETHING AWFUL MIGHT HAPPEN: MORE THAN HALF THE DAYS
GAD7 TOTAL SCORE: 19
2. NOT BEING ABLE TO STOP OR CONTROL WORRYING: NEARLY EVERY DAY
5. BEING SO RESTLESS THAT IT IS HARD TO SIT STILL: MORE THAN HALF THE DAYS
GAD7 TOTAL SCORE: 19
3. WORRYING TOO MUCH ABOUT DIFFERENT THINGS: NEARLY EVERY DAY
1. FEELING NERVOUS, ANXIOUS, OR ON EDGE: NEARLY EVERY DAY
IF YOU CHECKED OFF ANY PROBLEMS ON THIS QUESTIONNAIRE, HOW DIFFICULT HAVE THESE PROBLEMS MADE IT FOR YOU TO DO YOUR WORK, TAKE CARE OF THINGS AT HOME, OR GET ALONG WITH OTHER PEOPLE: EXTREMELY DIFFICULT
4. TROUBLE RELAXING: NEARLY EVERY DAY
8. IF YOU CHECKED OFF ANY PROBLEMS, HOW DIFFICULT HAVE THESE MADE IT FOR YOU TO DO YOUR WORK, TAKE CARE OF THINGS AT HOME, OR GET ALONG WITH OTHER PEOPLE?: EXTREMELY DIFFICULT
6. BECOMING EASILY ANNOYED OR IRRITABLE: NEARLY EVERY DAY

## 2024-04-23 ASSESSMENT — PATIENT HEALTH QUESTIONNAIRE - PHQ9
SUM OF ALL RESPONSES TO PHQ QUESTIONS 1-9: 18
SUM OF ALL RESPONSES TO PHQ QUESTIONS 1-9: 18
10. IF YOU CHECKED OFF ANY PROBLEMS, HOW DIFFICULT HAVE THESE PROBLEMS MADE IT FOR YOU TO DO YOUR WORK, TAKE CARE OF THINGS AT HOME, OR GET ALONG WITH OTHER PEOPLE: EXTREMELY DIFFICULT

## 2024-04-23 NOTE — PROGRESS NOTES
Assessment & Plan     Hypothyroidism due to Hashimoto's thyroiditis  Will adjust Levothyroxine dose based on labs.  - TSH with free T4 reflex    Current moderate episode of major depressive disorder without prior episode (H)  Mood improved with Lexapro, but experiencing nausea with the medication.  I recommend switching to a different medication, however patient would like to try 1/2 tablet of the Lexapro for a while first.  Instructed her to continue with the 1/2 tablet and may increase to 1 full tablet if tolerating it better.  If symptoms are not resolving, recommend switching the medication.  She will let me know if that is the case.    Ventral incisional hernia  Refer back to general surgery to discuss getting rescheduled for repair.   - Adult General Surg Referral    Screening for diabetes mellitus  - Comprehensive metabolic panel (BMP + Alb, Alk Phos, ALT, AST, Total. Bili, TP)    Screening for lipid disorders  - Lipid panel reflex to direct LDL Non-fasting    History of pulmonary valve stenosis  S/P valvotomy as an infant.  Per last cardiology note in 2007, recommend SBE prophylaxis and echocardiogram every other year.  Will send in Amoxicillin for dental prophylaxis.  Recommend rechecking echocardiogram.   - Echocardiogram Complete; Future  - amoxicillin (AMOXIL) 500 MG capsule; Take 4 capsules (2,000 mg) by mouth once for 1 dose Prior to dental procedure  Dispense: 4 capsule; Refill: 0      Depression Screening Follow Up        4/23/2024    11:03 AM   PHQ   PHQ-9 Total Score 18   Q9: Thoughts of better off dead/self-harm past 2 weeks Not at all         Follow Up Actions Taken  Crisis resource information provided in After Visit Summary  Patient has experienced a recent significant life event.  Patient counseled, no additional follow up at this time.           Toma Gamble is a 53 year old who presents for follow-up.  Patient is now living in [place] and will likely be establishing somewhere there  "in the future.    History of hypothyroidism.  She has been taking her levothyroxine consistently for the past 3 to 4 months.  She feels much better on the medication and has a lot more energy throughout the day.    Started on Lexapro last month for depression.  Her mood does feel improved.  She still has some ups and downs, but overall feeling better.  She does find the Lexapro makes her feel little bit nauseated and dizzy.  She does not vomit with this.  She is taking it at night.  Also thinks her ventral hernia could be contributing to this.    Patient was scheduled for a ventral hernia repair last year, but had to cancel due to poorly controlled hypothyroidism.  She would like to get scheduled for this again.    Patient is also wondering if she needs prophylactic antibiotics for dental work.  She tells me she has a history of a \"prolapsed valve\" as a child that required repair.  She is unsure if there is any artificial devices or other materials implanted during this time.  Upon further review of her chart, she was last seen by cardiology in 2008.  Per their note, patient has a history of pulmonic valve stenosis (dome pulmonic valve with normal sinuses) and is s/p valvotomy as an infant.  At that time, they recommended continuing dental prophylaxis and also recommended echocardiograms every 2 years.    Follow Up      History of Present Illness       Mental Health Follow-up:  Patient presents to follow-up on Depression & Anxiety.Patient's depression since last visit has been:  Medium  The patient is having other symptoms associated with depression.  Patient's anxiety since last visit has been:  Medium  The patient is having other symptoms associated with anxiety.  Any significant life events: job concerns, financial concerns, housing concerns and health concerns  Patient is not feeling anxious or having panic attacks.  Patient has no concerns about alcohol or drug use.    Hypothyroidism:     Since last visit, " patient describes the following symptoms::  Depression    She eats 2-3 servings of fruits and vegetables daily.She consumes 1 sweetened beverage(s) daily.She exercises with enough effort to increase her heart rate 20 to 29 minutes per day.  She exercises with enough effort to increase her heart rate 3 or less days per week.   She is taking medications regularly.         Review of Systems  Pertinent items in HPI        Objective    /80 (BP Location: Right arm, Patient Position: Sitting, Cuff Size: Adult Regular)   Pulse 84   Temp 97.7  F (36.5  C) (Temporal)   Resp 16   Wt 83.9 kg (185 lb)   LMP  (LMP Unknown)   SpO2 98%   BMI 33.84 kg/m    Body mass index is 33.84 kg/m .  Physical Exam   GENERAL: alert and no distress  NECK: no adenopathy, no asymmetry, masses, or scars  RESP: lungs clear to auscultation - no rales, rhonchi or wheezes  CV: regular rate and rhythm, normal S1 S2, no S3 or S4, no murmur, click or rub, no peripheral edema   PSYCH: mentation appears normal, affect normal/bright            Signed Electronically by: Maryjane Palacios NP

## 2024-04-24 RX ORDER — LEVOTHYROXINE SODIUM 75 UG/1
75 TABLET ORAL DAILY
Qty: 90 TABLET | Refills: 0 | Status: SHIPPED | OUTPATIENT
Start: 2024-04-24 | End: 2024-07-22

## 2024-07-22 DIAGNOSIS — E06.3 HYPOTHYROIDISM DUE TO HASHIMOTO'S THYROIDITIS: ICD-10-CM

## 2024-07-22 NOTE — TELEPHONE ENCOUNTER
Patient calls with 2 requests:  She states she would like her Thyroid lab checked tomorrow prior to surgery. Informed her that an order is already in place and that she can call and coordinate an appointment with lab.   She needs a refill of her Levothyroxine and would like it sent to the [facility] in [place] that's list in her chart. Will route to refill team.

## 2024-07-22 NOTE — TELEPHONE ENCOUNTER
General Call    Contacts       Contact Date/Time Type Contact Phone/Fax    07/22/2024 10:32 AM CDT Phone (Incoming) Mavis Connell (Self) [phone] ()          Reason for Call:  follow up quewstions regarding hernia     What are your questions or concerns:  hernia    Date of last appointment with provider: 4/23/24     Could we send this information to you in [employer] or would you prefer to receive a phone call?:   Patient would prefer a phone call   Okay to leave a detailed message?: Yes at Cell number on file:    Telephone Information:   Mobile [phone]

## 2024-07-22 NOTE — TELEPHONE ENCOUNTER
FYI- patient has appt with Lola Morning and then also general surgery scheduled for tomorrow, 7/23.

## 2024-07-23 DIAGNOSIS — F32.1 CURRENT MODERATE EPISODE OF MAJOR DEPRESSIVE DISORDER WITHOUT PRIOR EPISODE (H): ICD-10-CM

## 2024-07-23 RX ORDER — ESCITALOPRAM OXALATE 10 MG/1
10 TABLET ORAL DAILY
Qty: 90 TABLET | Refills: 0 | Status: SHIPPED | OUTPATIENT
Start: 2024-07-23

## 2024-07-23 RX ORDER — LEVOTHYROXINE SODIUM 75 UG/1
75 TABLET ORAL DAILY
Qty: 90 TABLET | Refills: 0 | Status: SHIPPED | OUTPATIENT
Start: 2024-07-23

## 2024-09-28 ENCOUNTER — HEALTH MAINTENANCE LETTER (OUTPATIENT)
Age: 54
End: 2024-09-28

## 2025-08-09 ENCOUNTER — HEALTH MAINTENANCE LETTER (OUTPATIENT)
Age: 55
End: 2025-08-09